# Patient Record
Sex: FEMALE | Race: WHITE | Employment: FULL TIME | URBAN - METROPOLITAN AREA
[De-identification: names, ages, dates, MRNs, and addresses within clinical notes are randomized per-mention and may not be internally consistent; named-entity substitution may affect disease eponyms.]

---

## 2018-07-07 ENCOUNTER — APPOINTMENT (OUTPATIENT)
Dept: GENERAL RADIOLOGY | Age: 33
DRG: 305 | End: 2018-07-07
Attending: EMERGENCY MEDICINE
Payer: SELF-PAY

## 2018-07-07 ENCOUNTER — HOSPITAL ENCOUNTER (INPATIENT)
Age: 33
LOS: 1 days | Discharge: HOME | DRG: 305 | End: 2018-07-08
Attending: EMERGENCY MEDICINE | Admitting: INTERNAL MEDICINE
Payer: SELF-PAY

## 2018-07-07 DIAGNOSIS — R77.8 ELEVATED TROPONIN: ICD-10-CM

## 2018-07-07 DIAGNOSIS — I16.1 HYPERTENSIVE EMERGENCY: Primary | ICD-10-CM

## 2018-07-07 PROBLEM — E66.01 MORBID OBESITY (HCC): Chronic | Status: ACTIVE | Noted: 2018-07-07

## 2018-07-07 PROBLEM — Z86.59 HISTORY OF PANIC ATTACKS: Chronic | Status: ACTIVE | Noted: 2018-07-07

## 2018-07-07 PROBLEM — E11.9 TYPE 2 DIABETES MELLITUS (HCC): Status: ACTIVE | Noted: 2018-07-07

## 2018-07-07 PROBLEM — F17.210 CIGARETTE SMOKER: Chronic | Status: ACTIVE | Noted: 2018-07-07

## 2018-07-07 PROBLEM — I24.8 DEMAND ISCHEMIA (HCC): Status: ACTIVE | Noted: 2018-07-07

## 2018-07-07 PROBLEM — I25.10 CORONARY ARTERY DISEASE INVOLVING NATIVE CORONARY ARTERY: Status: ACTIVE | Noted: 2018-07-07

## 2018-07-07 PROBLEM — I10 HYPERTENSION: Chronic | Status: ACTIVE | Noted: 2018-07-07

## 2018-07-07 PROBLEM — I25.10 CORONARY ARTERY DISEASE INVOLVING NATIVE CORONARY ARTERY: Chronic | Status: ACTIVE | Noted: 2018-07-07

## 2018-07-07 PROBLEM — E11.9 DIABETES (HCC): Chronic | Status: ACTIVE | Noted: 2018-07-07

## 2018-07-07 PROBLEM — F41.9 ANXIETY: Chronic | Status: ACTIVE | Noted: 2018-07-07

## 2018-07-07 LAB
ALBUMIN SERPL-MCNC: 3.8 G/DL (ref 3.5–5)
ALBUMIN/GLOB SERPL: 0.9 {RATIO} (ref 1.2–3.5)
ALP SERPL-CCNC: 82 U/L (ref 50–136)
ALT SERPL-CCNC: 34 U/L (ref 12–65)
ANION GAP SERPL CALC-SCNC: 9 MMOL/L (ref 7–16)
AST SERPL-CCNC: 19 U/L (ref 15–37)
BASOPHILS # BLD: 0.1 K/UL (ref 0–0.2)
BASOPHILS NFR BLD: 1 % (ref 0–2)
BILIRUB SERPL-MCNC: 0.3 MG/DL (ref 0.2–1.1)
BUN SERPL-MCNC: 10 MG/DL (ref 6–23)
CALCIUM SERPL-MCNC: 8.7 MG/DL (ref 8.3–10.4)
CHLORIDE SERPL-SCNC: 100 MMOL/L (ref 98–107)
CO2 SERPL-SCNC: 28 MMOL/L (ref 21–32)
CREAT SERPL-MCNC: 0.89 MG/DL (ref 0.6–1)
DIFFERENTIAL METHOD BLD: ABNORMAL
EOSINOPHIL # BLD: 0.2 K/UL (ref 0–0.8)
EOSINOPHIL NFR BLD: 2 % (ref 0.5–7.8)
ERYTHROCYTE [DISTWIDTH] IN BLOOD BY AUTOMATED COUNT: 12.8 % (ref 11.9–14.6)
GLOBULIN SER CALC-MCNC: 4.3 G/DL (ref 2.3–3.5)
GLUCOSE BLD STRIP.AUTO-MCNC: 294 MG/DL (ref 65–100)
GLUCOSE BLD STRIP.AUTO-MCNC: 318 MG/DL (ref 65–100)
GLUCOSE SERPL-MCNC: 289 MG/DL (ref 65–100)
HCT VFR BLD AUTO: 46.1 % (ref 35.8–46.3)
HGB BLD-MCNC: 16.9 G/DL (ref 11.7–15.4)
IMM GRANULOCYTES # BLD: 0.1 K/UL (ref 0–0.5)
IMM GRANULOCYTES NFR BLD AUTO: 1 % (ref 0–5)
LYMPHOCYTES # BLD: 3.7 K/UL (ref 0.5–4.6)
LYMPHOCYTES NFR BLD: 29 % (ref 13–44)
MCH RBC QN AUTO: 32.1 PG (ref 26.1–32.9)
MCHC RBC AUTO-ENTMCNC: 36.7 G/DL (ref 31.4–35)
MCV RBC AUTO: 87.5 FL (ref 79.6–97.8)
MONOCYTES # BLD: 0.6 K/UL (ref 0.1–1.3)
MONOCYTES NFR BLD: 4 % (ref 4–12)
NEUTS SEG # BLD: 8.3 K/UL (ref 1.7–8.2)
NEUTS SEG NFR BLD: 63 % (ref 43–78)
PLATELET # BLD AUTO: 267 K/UL (ref 150–450)
PMV BLD AUTO: 9.5 FL (ref 10.8–14.1)
POTASSIUM SERPL-SCNC: 3.4 MMOL/L (ref 3.5–5.1)
PROT SERPL-MCNC: 8.1 G/DL (ref 6.3–8.2)
RBC # BLD AUTO: 5.27 M/UL (ref 4.05–5.25)
SODIUM SERPL-SCNC: 137 MMOL/L (ref 136–145)
TROPONIN I SERPL-MCNC: 0.1 NG/ML (ref 0.02–0.05)
TROPONIN I SERPL-MCNC: 0.1 NG/ML (ref 0.02–0.05)
WBC # BLD AUTO: 12.9 K/UL (ref 4.3–11.1)

## 2018-07-07 PROCEDURE — 84484 ASSAY OF TROPONIN QUANT: CPT | Performed by: EMERGENCY MEDICINE

## 2018-07-07 PROCEDURE — 99285 EMERGENCY DEPT VISIT HI MDM: CPT | Performed by: EMERGENCY MEDICINE

## 2018-07-07 PROCEDURE — 93005 ELECTROCARDIOGRAM TRACING: CPT | Performed by: EMERGENCY MEDICINE

## 2018-07-07 PROCEDURE — 85025 COMPLETE CBC W/AUTO DIFF WBC: CPT | Performed by: EMERGENCY MEDICINE

## 2018-07-07 PROCEDURE — 74011000250 HC RX REV CODE- 250: Performed by: EMERGENCY MEDICINE

## 2018-07-07 PROCEDURE — 65270000029 HC RM PRIVATE

## 2018-07-07 PROCEDURE — 74011636637 HC RX REV CODE- 636/637: Performed by: FAMILY MEDICINE

## 2018-07-07 PROCEDURE — 36415 COLL VENOUS BLD VENIPUNCTURE: CPT | Performed by: INTERNAL MEDICINE

## 2018-07-07 PROCEDURE — 74011250637 HC RX REV CODE- 250/637: Performed by: FAMILY MEDICINE

## 2018-07-07 PROCEDURE — 96374 THER/PROPH/DIAG INJ IV PUSH: CPT | Performed by: EMERGENCY MEDICINE

## 2018-07-07 PROCEDURE — 74011250636 HC RX REV CODE- 250/636: Performed by: INTERNAL MEDICINE

## 2018-07-07 PROCEDURE — 74011636637 HC RX REV CODE- 636/637: Performed by: INTERNAL MEDICINE

## 2018-07-07 PROCEDURE — 74011250637 HC RX REV CODE- 250/637: Performed by: EMERGENCY MEDICINE

## 2018-07-07 PROCEDURE — 82962 GLUCOSE BLOOD TEST: CPT

## 2018-07-07 PROCEDURE — 74011250637 HC RX REV CODE- 250/637: Performed by: INTERNAL MEDICINE

## 2018-07-07 PROCEDURE — 71045 X-RAY EXAM CHEST 1 VIEW: CPT

## 2018-07-07 PROCEDURE — 80053 COMPREHEN METABOLIC PANEL: CPT | Performed by: EMERGENCY MEDICINE

## 2018-07-07 RX ORDER — ZOLPIDEM TARTRATE 5 MG/1
5 TABLET ORAL
Status: DISCONTINUED | OUTPATIENT
Start: 2018-07-07 | End: 2018-07-08 | Stop reason: HOSPADM

## 2018-07-07 RX ORDER — LABETALOL HYDROCHLORIDE 5 MG/ML
20 INJECTION, SOLUTION INTRAVENOUS ONCE
Status: COMPLETED | OUTPATIENT
Start: 2018-07-07 | End: 2018-07-07

## 2018-07-07 RX ORDER — LORAZEPAM 1 MG/1
1 TABLET ORAL
Status: DISCONTINUED | OUTPATIENT
Start: 2018-07-07 | End: 2018-07-08 | Stop reason: HOSPADM

## 2018-07-07 RX ORDER — CLONIDINE HYDROCHLORIDE 0.1 MG/1
0.2 TABLET ORAL
Status: COMPLETED | OUTPATIENT
Start: 2018-07-07 | End: 2018-07-07

## 2018-07-07 RX ORDER — SODIUM CHLORIDE 0.9 % (FLUSH) 0.9 %
5-10 SYRINGE (ML) INJECTION AS NEEDED
Status: DISCONTINUED | OUTPATIENT
Start: 2018-07-07 | End: 2018-07-08 | Stop reason: HOSPADM

## 2018-07-07 RX ORDER — HYDRALAZINE HYDROCHLORIDE 50 MG/1
25 TABLET, FILM COATED ORAL 3 TIMES DAILY
Status: ON HOLD | COMMUNITY
End: 2018-07-08

## 2018-07-07 RX ORDER — GABAPENTIN 100 MG/1
100 CAPSULE ORAL 3 TIMES DAILY
Status: DISCONTINUED | OUTPATIENT
Start: 2018-07-07 | End: 2018-07-08 | Stop reason: HOSPADM

## 2018-07-07 RX ORDER — CLONIDINE HYDROCHLORIDE 0.2 MG/1
0.2 TABLET ORAL 3 TIMES DAILY
COMMUNITY

## 2018-07-07 RX ORDER — INSULIN GLARGINE 100 [IU]/ML
20 INJECTION, SOLUTION SUBCUTANEOUS
Status: DISCONTINUED | OUTPATIENT
Start: 2018-07-07 | End: 2018-07-08 | Stop reason: HOSPADM

## 2018-07-07 RX ORDER — ACETAMINOPHEN 325 MG/1
650 TABLET ORAL
Status: DISCONTINUED | OUTPATIENT
Start: 2018-07-07 | End: 2018-07-08 | Stop reason: HOSPADM

## 2018-07-07 RX ORDER — ISOSORBIDE DINITRATE 30 MG/1
30 TABLET ORAL DAILY
Status: ON HOLD | COMMUNITY
End: 2018-07-07

## 2018-07-07 RX ORDER — GUAIFENESIN 100 MG/5ML
324 LIQUID (ML) ORAL
Status: COMPLETED | OUTPATIENT
Start: 2018-07-07 | End: 2018-07-07

## 2018-07-07 RX ORDER — DIPHENHYDRAMINE HYDROCHLORIDE 50 MG/ML
25 INJECTION, SOLUTION INTRAMUSCULAR; INTRAVENOUS
Status: DISCONTINUED | OUTPATIENT
Start: 2018-07-07 | End: 2018-07-08 | Stop reason: HOSPADM

## 2018-07-07 RX ORDER — ONDANSETRON 2 MG/ML
4 INJECTION INTRAMUSCULAR; INTRAVENOUS
Status: DISCONTINUED | OUTPATIENT
Start: 2018-07-07 | End: 2018-07-08 | Stop reason: HOSPADM

## 2018-07-07 RX ORDER — INSULIN LISPRO 100 [IU]/ML
INJECTION, SOLUTION INTRAVENOUS; SUBCUTANEOUS
Status: DISCONTINUED | OUTPATIENT
Start: 2018-07-07 | End: 2018-07-08 | Stop reason: HOSPADM

## 2018-07-07 RX ORDER — ENALAPRILAT 1.25 MG/ML
1.25 INJECTION INTRAVENOUS
Status: DISCONTINUED | OUTPATIENT
Start: 2018-07-07 | End: 2018-07-08 | Stop reason: HOSPADM

## 2018-07-07 RX ORDER — ISOSORBIDE DINITRATE 10 MG/1
30 TABLET ORAL DAILY
Status: DISCONTINUED | OUTPATIENT
Start: 2018-07-08 | End: 2018-07-08 | Stop reason: HOSPADM

## 2018-07-07 RX ORDER — AMLODIPINE BESYLATE 10 MG/1
10 TABLET ORAL DAILY
Status: DISCONTINUED | OUTPATIENT
Start: 2018-07-07 | End: 2018-07-07 | Stop reason: SDUPTHER

## 2018-07-07 RX ORDER — HYDRALAZINE HYDROCHLORIDE 25 MG/1
25 TABLET, FILM COATED ORAL 3 TIMES DAILY
Status: DISCONTINUED | OUTPATIENT
Start: 2018-07-07 | End: 2018-07-08

## 2018-07-07 RX ORDER — POTASSIUM CHLORIDE 20 MEQ/1
40 TABLET, EXTENDED RELEASE ORAL
Status: COMPLETED | OUTPATIENT
Start: 2018-07-07 | End: 2018-07-07

## 2018-07-07 RX ORDER — HYDRALAZINE HYDROCHLORIDE 20 MG/ML
20 INJECTION INTRAMUSCULAR; INTRAVENOUS
Status: DISCONTINUED | OUTPATIENT
Start: 2018-07-07 | End: 2018-07-07 | Stop reason: RX

## 2018-07-07 RX ORDER — CARVEDILOL 12.5 MG/1
12.5 TABLET ORAL 2 TIMES DAILY WITH MEALS
Status: ON HOLD | COMMUNITY
End: 2018-07-08

## 2018-07-07 RX ORDER — NALOXONE HYDROCHLORIDE 0.4 MG/ML
0.4 INJECTION, SOLUTION INTRAMUSCULAR; INTRAVENOUS; SUBCUTANEOUS AS NEEDED
Status: DISCONTINUED | OUTPATIENT
Start: 2018-07-07 | End: 2018-07-08 | Stop reason: HOSPADM

## 2018-07-07 RX ORDER — ISOSORBIDE MONONITRATE 20 MG/1
TABLET ORAL DAILY
COMMUNITY

## 2018-07-07 RX ORDER — FUROSEMIDE 20 MG/1
20 TABLET ORAL DAILY
Status: DISCONTINUED | OUTPATIENT
Start: 2018-07-08 | End: 2018-07-08 | Stop reason: HOSPADM

## 2018-07-07 RX ORDER — DEXTROSE 50 % IN WATER (D50W) INTRAVENOUS SYRINGE
25-50 AS NEEDED
Status: DISCONTINUED | OUTPATIENT
Start: 2018-07-07 | End: 2018-07-08 | Stop reason: HOSPADM

## 2018-07-07 RX ORDER — METOPROLOL TARTRATE 5 MG/5ML
5 INJECTION INTRAVENOUS
Status: DISCONTINUED | OUTPATIENT
Start: 2018-07-07 | End: 2018-07-08 | Stop reason: HOSPADM

## 2018-07-07 RX ORDER — DEXTROSE 40 %
15 GEL (GRAM) ORAL AS NEEDED
Status: DISCONTINUED | OUTPATIENT
Start: 2018-07-07 | End: 2018-07-08 | Stop reason: HOSPADM

## 2018-07-07 RX ORDER — AMLODIPINE BESYLATE 10 MG/1
10 TABLET ORAL DAILY
Status: DISCONTINUED | OUTPATIENT
Start: 2018-07-08 | End: 2018-07-08 | Stop reason: HOSPADM

## 2018-07-07 RX ORDER — SODIUM CHLORIDE 0.9 % (FLUSH) 0.9 %
5-10 SYRINGE (ML) INJECTION EVERY 8 HOURS
Status: DISCONTINUED | OUTPATIENT
Start: 2018-07-07 | End: 2018-07-08 | Stop reason: HOSPADM

## 2018-07-07 RX ORDER — HYDRALAZINE HYDROCHLORIDE 50 MG/1
50 TABLET, FILM COATED ORAL
Status: DISCONTINUED | OUTPATIENT
Start: 2018-07-07 | End: 2018-07-08 | Stop reason: HOSPADM

## 2018-07-07 RX ORDER — CARVEDILOL 12.5 MG/1
12.5 TABLET ORAL 2 TIMES DAILY WITH MEALS
Status: DISCONTINUED | OUTPATIENT
Start: 2018-07-08 | End: 2018-07-08

## 2018-07-07 RX ORDER — FUROSEMIDE 20 MG/1
20 TABLET ORAL DAILY
COMMUNITY

## 2018-07-07 RX ORDER — HYDRALAZINE HYDROCHLORIDE 50 MG/1
50 TABLET, FILM COATED ORAL 3 TIMES DAILY
Status: DISCONTINUED | OUTPATIENT
Start: 2018-07-07 | End: 2018-07-07 | Stop reason: SDUPTHER

## 2018-07-07 RX ORDER — ENOXAPARIN SODIUM 100 MG/ML
40 INJECTION SUBCUTANEOUS EVERY 12 HOURS
Status: DISCONTINUED | OUTPATIENT
Start: 2018-07-07 | End: 2018-07-08 | Stop reason: HOSPADM

## 2018-07-07 RX ORDER — GABAPENTIN 100 MG/1
100 CAPSULE ORAL 3 TIMES DAILY
COMMUNITY

## 2018-07-07 RX ORDER — CLONIDINE HYDROCHLORIDE 0.2 MG/1
0.2 TABLET ORAL 3 TIMES DAILY
Status: DISCONTINUED | OUTPATIENT
Start: 2018-07-07 | End: 2018-07-08 | Stop reason: HOSPADM

## 2018-07-07 RX ORDER — CLONIDINE HYDROCHLORIDE 0.1 MG/1
0.2 TABLET ORAL 2 TIMES DAILY
Status: DISCONTINUED | OUTPATIENT
Start: 2018-07-08 | End: 2018-07-07 | Stop reason: SDUPTHER

## 2018-07-07 RX ORDER — AMOXICILLIN 250 MG
2 CAPSULE ORAL
Status: DISCONTINUED | OUTPATIENT
Start: 2018-07-07 | End: 2018-07-08 | Stop reason: HOSPADM

## 2018-07-07 RX ORDER — HYDROCODONE BITARTRATE AND ACETAMINOPHEN 5; 325 MG/1; MG/1
1 TABLET ORAL
Status: DISCONTINUED | OUTPATIENT
Start: 2018-07-07 | End: 2018-07-08 | Stop reason: HOSPADM

## 2018-07-07 RX ORDER — CARVEDILOL 12.5 MG/1
12.5 TABLET ORAL 2 TIMES DAILY WITH MEALS
Status: DISCONTINUED | OUTPATIENT
Start: 2018-07-07 | End: 2018-07-07 | Stop reason: SDUPTHER

## 2018-07-07 RX ORDER — ENOXAPARIN SODIUM 100 MG/ML
40 INJECTION SUBCUTANEOUS EVERY 24 HOURS
Status: DISCONTINUED | OUTPATIENT
Start: 2018-07-07 | End: 2018-07-07 | Stop reason: DRUGHIGH

## 2018-07-07 RX ORDER — AMLODIPINE BESYLATE 10 MG/1
10 TABLET ORAL DAILY
COMMUNITY

## 2018-07-07 RX ADMIN — POTASSIUM CHLORIDE 40 MEQ: 1500 TABLET, EXTENDED RELEASE ORAL at 16:37

## 2018-07-07 RX ADMIN — AMLODIPINE BESYLATE 10 MG: 10 TABLET ORAL at 19:45

## 2018-07-07 RX ADMIN — HYDRALAZINE HYDROCHLORIDE 50 MG: 50 TABLET, FILM COATED ORAL at 19:46

## 2018-07-07 RX ADMIN — CARVEDILOL 12.5 MG: 12.5 TABLET, FILM COATED ORAL at 19:47

## 2018-07-07 RX ADMIN — INSULIN LISPRO 8 UNITS: 100 INJECTION, SOLUTION INTRAVENOUS; SUBCUTANEOUS at 21:14

## 2018-07-07 RX ADMIN — LABETALOL HYDROCHLORIDE 20 MG: 5 INJECTION, SOLUTION INTRAVENOUS at 15:35

## 2018-07-07 RX ADMIN — ENOXAPARIN SODIUM 40 MG: 100 INJECTION SUBCUTANEOUS at 21:14

## 2018-07-07 RX ADMIN — ZOLPIDEM TARTRATE 5 MG: 5 TABLET ORAL at 21:13

## 2018-07-07 RX ADMIN — Medication 10 ML: at 21:16

## 2018-07-07 RX ADMIN — INSULIN GLARGINE 20 UNITS: 100 INJECTION, SOLUTION SUBCUTANEOUS at 21:15

## 2018-07-07 RX ADMIN — HYDRALAZINE HYDROCHLORIDE 25 MG: 25 TABLET, FILM COATED ORAL at 22:37

## 2018-07-07 RX ADMIN — GABAPENTIN 100 MG: 100 CAPSULE ORAL at 22:36

## 2018-07-07 RX ADMIN — HYDROCODONE BITARTRATE AND ACETAMINOPHEN 1 TABLET: 5; 325 TABLET ORAL at 21:14

## 2018-07-07 RX ADMIN — CLONIDINE HYDROCHLORIDE 0.2 MG: 0.1 TABLET ORAL at 15:36

## 2018-07-07 RX ADMIN — ASPIRIN 81 MG 324 MG: 81 TABLET ORAL at 15:36

## 2018-07-07 NOTE — ROUTINE PROCESS
TRANSFER - OUT REPORT: 
 
Verbal report given to Tomeka Saucedo RN  on Karri Cardoza  being transferred to [address] [place] [address] for routine progression of care Report consisted of patients Situation, Background, Assessment and  
Recommendations(SBAR). Information from the following report(s) SBAR was reviewed with the receiving nurse. Lines:  
Peripheral IV 07/07/18 Left Antecubital (Active) Site Assessment Clean, dry, & intact 7/7/2018  3:39 PM  
Phlebitis Assessment 0 7/7/2018  3:39 PM  
Infiltration Assessment 0 7/7/2018  3:39 PM  
Dressing Status Clean, dry, & intact 7/7/2018  3:39 PM  
Dressing Type Transparent 7/7/2018  3:39 PM  
Hub Color/Line Status Pink 7/7/2018  3:39 PM  
  
 
Opportunity for questions and clarification was provided. Patient transported with: 
 [employer]

## 2018-07-07 NOTE — PROGRESS NOTES
TRANSFER - IN REPORT: 
 
Verbal report received from [facility] (name) on [address]  being received from ER (unit) for routine progression of care Report consisted of patients Situation, Background, Assessment and  
Recommendations(SBAR). Information from the following report(s) SBAR and Kardex was reviewed with the receiving nurse. Opportunity for questions and clarification was provided. Assessment completed upon patients arrival to unit and care assumed. SBAR given to primary receiving RN, April Shira Cisneros.

## 2018-07-07 NOTE — H&P
Hospitalist H&P Note Admit Date:  2018  2:45 PM  
Name:  See Boucher Age:  26 y.o. 
:  1985 MRN:  [id number] PCP:  Coreen Martinez MD 
Treatment Team: Attending Provider: Glory Siddiqi MD; Primary Nurse: Myles Gutiérrez RN 
 
HPI:  
Pt is a 27 y/o F with reported history of CAD s/p 2 stents, anxiety, obesity, cigarette smoker, DM, HTN, who presented with moderate chest pressure/pain that radiates to back, and SOB that started at 1130am.  Seemed to come and go and get worse with time. At first she thought it may be a panic attack but after 2-3 hours it persisted so she decided to come to the ER for evaluation. BP was very elevated on arrival 252/145. troponin mildly elevated. She was given some BP meds in ER and her symptoms seemed to improve some though she still has mild pressure and SOB at times. Denies diaphoresis, fevers, chills, n/v, cough. 10 systems reviewed and negative except as noted in HPI. Past Medical History:  
Diagnosis Date  Diabetes ([facility] [address]  Hypertension  Psychiatric disorder Past Surgical History:  
Procedure Laterality Date  CARDIAC SURG PROCEDURE UNLIST    
 stent Allergies Allergen Reactions  Flexeril [Cyclobenzaprine] Other (comments)  Tape [Adhesive] Other (comments)  Tramadol Other (comments) Social History Substance Use Topics  Smoking status: Current Every Day Smoker Packs/day: 0.25  Smokeless tobacco: Not on file  Alcohol use No  
  
History reviewed. No pertinent family history. There is no immunization history on file for this patient. PTA Medications: 
None Objective:  
 
Patient Vitals for the past 24 hrs: 
 Temp Pulse Resp BP SpO2  
18 1641 - - - - 97 % 18 1610 - - - (!) 189/112 -  
18 1605 - - - - 97 % 18 1551 - - - - 96 % 18 1536 - - - - 97 % 18 1502 - (!) 103 23 (!) 205/134 97 % 18 1444 - - - (!) 252/145 -  
18 1440 98.3 °F (36.8 °C) (!) 111 16 - 98 % Oxygen Therapy O2 Sat (%): 97 % (07/07/18 1641) Pulse via Oximetry: 95 beats per minute (07/07/18 1641) O2 Device: Room air (07/07/18 1440) No intake or output data in the 24 hours ending 07/07/18 1654 Physical Exam: 
General:    Well nourished. Alert. Eyes:   Normal sclera. Extraocular movements intact. ENT:  Normocephalic, atraumatic. Moist mucous membranes CV:   RRR. No m/r/g. Peripheral pulses 2+. Capillary refill <2s. Lungs:  CTAB. No wheezing, rhonchi, or rales. Abdomen: Soft, nontender, nondistended. Extremities: Warm and dry. No cyanosis or edema. Neurologic: CN II-XII grossly intact. Sensation intact. Skin:     No rashes or jaundice. Normal coloration Psych:  Normal mood and affect. I reviewed the labs, imaging, EKGs, telemetry, and other studies done this admission. Data Review:  
Recent Results (from the past 24 hour(s)) CBC WITH AUTOMATED DIFF Collection Time: 07/07/18  2:58 PM  
Result Value Ref Range WBC 12.9 (H) 4.3 - 11.1 K/uL  
 RBC 5.27 (H) 4.05 - 5.25 M/uL  
 HGB 16.9 (H) 11.7 - 15.4 g/dL HCT 46.1 35.8 - 46.3 % MCV 87.5 79.6 - 97.8 FL  
 MCH 32.1 26.1 - 32.9 PG  
 MCHC 36.7 (H) 31.4 - 35.0 g/dL  
 RDW 12.8 11.9 - 14.6 % PLATELET 931 815 - 403 K/uL MPV 9.5 (L) 10.8 - 14.1 FL  
 DF AUTOMATED NEUTROPHILS 63 43 - 78 % LYMPHOCYTES 29 13 - 44 % MONOCYTES 4 4.0 - 12.0 % EOSINOPHILS 2 0.5 - 7.8 % BASOPHILS 1 0.0 - 2.0 % IMMATURE GRANULOCYTES 1 0.0 - 5.0 %  
 ABS. NEUTROPHILS 8.3 (H) 1.7 - 8.2 K/UL  
 ABS. LYMPHOCYTES 3.7 0.5 - 4.6 K/UL  
 ABS. MONOCYTES 0.6 0.1 - 1.3 K/UL  
 ABS. EOSINOPHILS 0.2 0.0 - 0.8 K/UL  
 ABS. BASOPHILS 0.1 0.0 - 0.2 K/UL  
 ABS. IMM. GRANS. 0.1 0.0 - 0.5 K/UL METABOLIC PANEL, COMPREHENSIVE Collection Time: 07/07/18  2:58 PM  
Result Value Ref Range Sodium 137 136 - 145 mmol/L Potassium 3.4 (L) 3.5 - 5.1 mmol/L  Chloride 100 98 - 107 mmol/L  
 CO2 28 21 - 32 mmol/L Anion gap 9 7 - 16 mmol/L Glucose 289 (H) 65 - 100 mg/dL BUN 10 6 - 23 MG/DL Creatinine 0.89 0.6 - 1.0 MG/DL  
 GFR est AA >60 >60 ml/min/1.73m2 GFR est non-AA >60 >60 ml/min/1.73m2 Calcium 8.7 8.3 - 10.4 MG/DL Bilirubin, total 0.3 0.2 - 1.1 MG/DL  
 ALT (SGPT) 34 12 - 65 U/L  
 AST (SGOT) 19 15 - 37 U/L Alk. phosphatase 82 50 - 136 U/L Protein, total 8.1 6.3 - 8.2 g/dL Albumin 3.8 3.5 - 5.0 g/dL Globulin 4.3 (H) 2.3 - 3.5 g/dL A-G Ratio 0.9 (L) 1.2 - 3.5    
TROPONIN I Collection Time: 07/07/18  2:58 PM  
Result Value Ref Range Troponin-I, Qt. 0.10 (HH) 0.02 - 0.05 NG/ML  
EKG, 12 LEAD, INITIAL Collection Time: 07/07/18  2:59 PM  
Result Value Ref Range Ventricular Rate 103 BPM  
 Atrial Rate 108 BPM  
 P-R Interval 150 ms QRS Duration 96 ms  
 Q-T Interval 368 ms QTC Calculation (Bezet) 482 ms Calculated P Axis 53 degrees Calculated R Axis 70 degrees Calculated T Axis 32 degrees Diagnosis    
  !! AGE AND GENDER SPECIFIC ECG ANALYSIS !! Sinus tachycardia Cannot rule out Anterior infarct , age undetermined Abnormal ECG No previous ECGs available All Micro Results None Current Facility-Administered Medications Medication Dose Route Frequency  amLODIPine (NORVASC) tablet 10 mg  10 mg Oral DAILY  hydrALAZINE (APRESOLINE) tablet 50 mg  50 mg Oral TID  carvedilol (COREG) tablet 12.5 mg  12.5 mg Oral BID WITH MEALS  
 [START ON 7/8/2018] cloNIDine HCl (CATAPRES) tablet 0.2 mg  0.2 mg Oral BID No current outpatient prescriptions on file. Other Studies: Xr Chest [facility] Result Date: 7/7/2018 Portable chest x-ray CLINICAL INDICATION: Chest pain, dizziness FINDINGS: Single AP view the chest submitted without comparison show the lungs to be expanded and clear. No pleural effusion or pneumothorax. The cardiac silhouette and mediastinum are unremarkable.   
 
IMPRESSION: Unremarkable portable chest x-ray. 
 
 
Assessment and Plan:  
 
Hospital Problems as of 7/7/2018  Never Reviewed Codes Class Noted - Resolved POA * (Principal)Hypertensive emergency ICD-10-CM: I16.1 ICD-9-CM: 401.9  7/7/2018 - Present Yes Demand ischemia ([facility] ICD-10-CM: I24.8 ICD-9-CM: 411.89  7/7/2018 - Present Yes Type 2 diabetes mellitus (HCC) (Chronic) ICD-10-CM: E11.9 ICD-9-CM: 250.00  7/7/2018 - Present Yes Hypertension (Chronic) ICD-10-CM: I10 
ICD-9-CM: 401.9  7/7/2018 - Present Yes History of panic attacks (Chronic) ICD-10-CM: Z86.59 
ICD-9-CM: V11.8  7/7/2018 - Present Yes Anxiety (Chronic) ICD-10-CM: F41.9 ICD-9-CM: 300.00  7/7/2018 - Present Yes Cigarette smoker (Chronic) ICD-10-CM: I99.522 ICD-9-CM: 305.1  7/7/2018 - Present Yes Morbid obesity ([facility] (Chronic) ICD-10-CM: E66.01 
ICD-9-CM: 278.01  7/7/2018 - Present Yes Coronary artery disease involving native coronary artery (Chronic) ICD-10-CM: I25.10 ICD-9-CM: 414.01  7/7/2018 - Present Yes PLAN: 
Admit to remote tele HTN 
- restart home meds. PRNs ordered Elevated trop 
-probably demand from HTN 
-trend trops DM 
-ISS 
-asked nursing to do med rec 
-check a1c in AM 
-check UA. If proteinuria, would benefit from ACEi. Check pregnancy status 
 
hypoK -Replace 
-Check Mg Anxiety 
-Prn ativan Cigarette smoker 
-encouraged to quit Discharge planning:  No anticipated needs DVT ppx: lovenox Code status:  Full Estimated LOS:  Greater than 2 midnights Risk:  high Signed: 
Melissa Zamudio MD

## 2018-07-07 NOTE — PROGRESS NOTES
Pt admitted from the ER. She is alert and oriented. rr are even and unlabored. Lung sounds are clear she is on room air. Tolerates well.  abd is soft bowel sounds are active. She has no c/o pain. Pt BP is elevated MD is aware. Pt is able to ambulate with out assistance. Pt skin is clear no issues noted. Pt does have multiple tattoos. She has a heeled ulcer on the bottom of her rt great toe. Dual skin assessment done with Anup Miranda RN. Safety measures in place will continue to monitor.

## 2018-07-07 NOTE — ED PROVIDER NOTES
HPI Comments: 25 yo female who is here with 2 hours of constant left sided chest pressure. It started while she was at work. She has a few seconds of sharp pain that immediately goes away during these seconds she had sob, but none otherwise. She did take plavix for 1 year following a stent. She has hx of severe htn is on amlodipine 10 mg, hydralazine 50 mg tid, carvedilol 12.5, clonidine 0.2, and lasix. She has not taken any medications today though. She states she has had hx of severe blood pressure spikes in the past as well. Patient is a 26 y.o. female presenting with chest pain. The history is provided by the patient. Chest Pain (Angina) Pertinent negatives include no abdominal pain, no fever, no nausea, no palpitations, no shortness of breath and no vomiting. Past Medical History:  
Diagnosis Date  Diabetes ([address]  Hypertension  Psychiatric disorder Past Surgical History:  
Procedure Laterality Date  CARDIAC SURG PROCEDURE UNLIST    
 stent History reviewed. No pertinent family history. Social History Social History  Marital status: N/A Spouse name: N/A  
 Number of children: N/A  
 Years of education: N/A Occupational History  Not on file. Social History Main Topics  Smoking status: Current Every Day Smoker Packs/day: 0.25  Smokeless tobacco: Not on file  Alcohol use No  
 Drug use: Not on file  Sexual activity: Not on file Other Topics Concern  Not on file Social History Narrative  No narrative on file ALLERGIES: Flexeril [cyclobenzaprine]; Tape [adhesive]; and Tramadol Review of Systems Constitutional: Negative for chills and fever. Respiratory: Negative for chest tightness, shortness of breath, wheezing and stridor. Cardiovascular: Positive for chest pain. Negative for palpitations. Gastrointestinal: Negative for abdominal pain, diarrhea, nausea and vomiting. Skin: Negative. All other systems reviewed and are negative. Vitals:  
 07/07/18 1440 07/07/18 1444 07/07/18 1502 BP:  (!) 252/145 (!) 205/134 Pulse: (!) 111  (!) 103 Resp: 16  23 Temp: 98.3 °F (36.8 °C) SpO2: 98%  97% Weight: 150.6 kg (332 lb) Height: 5' 11\" (1.803 m) Physical Exam  
Constitutional: She is oriented to person, place, and time. She appears well-developed and well-nourished. No distress. HENT:  
Head: Normocephalic and atraumatic. Eyes: Conjunctivae and EOM are normal. Pupils are equal, round, and reactive to light. No scleral icterus. Neck: Normal range of motion. Neck supple. Cardiovascular: Normal rate, regular rhythm, normal heart sounds and intact distal pulses. Exam reveals no gallop and no friction rub. No murmur heard. Pulmonary/Chest: Effort normal and breath sounds normal. No stridor. No respiratory distress. She has no wheezes. She has no rales. She exhibits no tenderness. Abdominal: Soft. She exhibits no distension. There is no tenderness. There is no rebound and no guarding. Musculoskeletal: She exhibits edema (minimal edema. ). Neurological: She is alert and oriented to person, place, and time. No focal weakness Skin: Skin is warm and dry. No rash noted. She is not diaphoretic. No erythema. Psychiatric: She has a normal mood and affect. Her behavior is normal.  
Nursing note and vitals reviewed. MDM Number of Diagnoses or Management Options Diagnosis management comments: Patient has very elevated blood pressure she is feeling better chest pain wise I have given her labetalol and clonidine in the ED. I have called and spoken with Dr. Lili Beckman of cardiology. Given the very high bp that asked that I discuss case with hospitalist which I have for admission. Christiano Castañeda MD; 7/7/2018 @4:38 PM Voice dictation software was used during the making of this note.   This software is not perfect and grammatical and other typographical errors may be present. This note has not been proofread for errors. 
=================================================================== Amount and/or Complexity of Data Reviewed Clinical lab tests: ordered and reviewed (Results for orders placed or performed during the hospital encounter of 07/07/18 
-CBC WITH AUTOMATED DIFF Result                                            Value                         Ref Range WBC                                               12.9 (H)                      4.3 - 11.1 K/uL           
     RBC                                               5.27 (H)                      4.05 - 5.25 M/uL HGB                                               16.9 (H)                      11.7 - 15.4 g/dL HCT                                               46.1                          35.8 - 46.3 % MCV                                               87.5                          79.6 - 97.8 FL            
     MCH                                               32.1                          26.1 - 32.9 PG            
     MCHC                                              36.7 (H)                      31.4 - 35.0 g/dL RDW                                               12.8                          11.9 - 14.6 % PLATELET                                          267                           150 - 450 K/uL MPV                                               9.5 (L)                       10.8 - 14.1 FL            
     DF                                                AUTOMATED NEUTROPHILS                                       63                            43 - 78 % LYMPHOCYTES                                       29                            13 - 44 %      MONOCYTES 4                             4.0 - 12.0 % EOSINOPHILS                                       2                             0.5 - 7.8 % BASOPHILS                                         1                             0.0 - 2.0 % IMMATURE GRANULOCYTES                             1                             0.0 - 5.0 %               
     ABS. NEUTROPHILS                                  8.3 (H)                       1.7 - 8.2 K/UL            
     ABS. LYMPHOCYTES                                  3.7                           0.5 - 4.6 K/UL            
     ABS. MONOCYTES                                    0.6                           0.1 - 1.3 K/UL            
     ABS. EOSINOPHILS                                  0.2                           0.0 - 0.8 K/UL            
     ABS. BASOPHILS                                    0.1                           0.0 - 0.2 K/UL            
     ABS. IMM. GRANS.                                  0.1                           0.0 - 0.5 K/UL            
-METABOLIC PANEL, COMPREHENSIVE Result                                            Value                         Ref Range Sodium                                            137                           136 - 145 mmol/L Potassium                                         3.4 (L)                       3.5 - 5.1 mmol/L Chloride                                          100                           98 - 107 mmol/L           
     CO2                                               28                            21 - 32 mmol/L Anion gap                                         9                             7 - 16 mmol/L Glucose                                           289 (H)                       65 - 100 mg/dL      BUN                                               10 6 - 23 MG/DL Creatinine                                        0.89                          0.6 - 1.0 MG/DL           
     GFR est AA                                        >60                           >60 ml/min/1.73m2 GFR est non-AA                                    >60                           >60 ml/min/1.73m2 Calcium                                           8.7                           8.3 - 10.4 MG/DL Bilirubin, total                                  0.3                           0.2 - 1.1 MG/DL           
     ALT (SGPT)                                        34                            12 - 65 U/L               
     AST (SGOT)                                        19                            15 - 37 U/L Alk. phosphatase                                  82                            50 - 136 U/L Protein, total                                    8.1                           6.3 - 8.2 g/dL Albumin                                           3.8                           3.5 - 5.0 g/dL Globulin                                          4.3 (H)                       2.3 - 3.5 g/dL A-G Ratio                                         0.9 (L)                       1.2 - 3.5                 
-TROPONIN I Result                                            Value                         Ref Range Troponin-I, Qt.                                   0.10 (HH)                     0.02 - 0.05 NG/ML         
-EKG, 12 LEAD, INITIAL Result                                            Value                         Ref Range      Ventricular Rate                                  103                           BPM                       
     Atrial Rate                                       108 BPM                       
     P-R Interval                                      150                           ms                        
     QRS Duration                                      96                            ms Q-T Interval                                      368                           ms                        
     QTC Calculation (Bezet)                           482                           ms                        
     Calculated P Axis                                 53                            degrees Calculated R Axis                                 70                            degrees Calculated T Axis                                 32                            degrees Diagnosis                                                                                                 
 !! AGE AND GENDER SPECIFIC ECG ANALYSIS !! Sinus tachycardia Cannot rule out Anterior infarct , age undetermined Abnormal ECG No previous ECGs available ) Tests in the radiology section of CPT®: ordered and reviewed (Xr Chest [facility] Result Date: 7/7/2018 Portable chest x-ray CLINICAL INDICATION: Chest pain, dizziness FINDINGS: Single AP view the chest submitted without comparison show the lungs to be expanded and clear. No pleural effusion or pneumothorax. The cardiac silhouette and mediastinum are unremarkable. IMPRESSION: Unremarkable portable chest x-ray. ) Independent visualization of images, tracings, or specimens: yes ED Course Procedures

## 2018-07-07 NOTE — ED TRIAGE NOTES
Pt states that she has chest pain, states that she has had one stent placed in the past and is having some chest pain and dizziness. Pt has not taken any of her BP meds, states that she takes a lot of BP meds.

## 2018-07-07 NOTE — ED NOTES
Attempted several times to obtain BP on pt. Pt cuff repositioned and moved. Radial BP attempted as well. Cuff changed.

## 2018-07-07 NOTE — PROGRESS NOTES
Patient sitting up in bed, using telephone, appears upset. No c/o discomfort, however, bp remains elevated. Will medicated when orders have cleared, will continue to monitor.

## 2018-07-07 NOTE — IP AVS SNAPSHOT
Summary of Care Report The Summary of Care report has been created to help improve care coordination. Users with access to [employer] or [facility] (Web-based application) may access additional patient information including the Discharge Summary. If you are not currently a [address] user and need more information, please call the number listed below in the [place] [address] section and ask to be connected with Medical Records. Facility Information Name Address Phone [phone] [address] [address] [phone] Patient Information Patient Name Sex  Sherlyn Broussard ([id number]) Female 1985 Discharge Information Admitting Provider Service Area Unit Emily Grady MD / [address] Med Surg / [phone] Discharge Provider Discharge Date/Time Discharge Disposition Destination (none) 2018 (Pending) AHR (none) Patient Language Language ENGLISH [13] Hospital Problems as of 2018  Never Reviewed Class Noted - Resolved Last Modified POA Active Problems * (Principal)Hypertensive emergency  2018 - Present 2018 by Emily Grady MD Yes Entered by Emily Grady MD  
  Demand ischemia [facility])  2018 - Present 2018 by Emily Grady MD Yes Entered by Emily Grady MD  
  Type 2 diabetes mellitus [facility]) (Chronic)  2018 - Present 2018 by Emily Grady MD Yes Entered by Emily Grady MD  
  Hypertension (Chronic)  2018 - Present 2018 by Emily Grady MD Yes Entered by Emily Grady MD  
  History of panic attacks (Chronic)  2018 - Present 2018 by Emily Grady MD Yes   Entered by Emily Grady MD  
 Anxiety (Chronic)  7/7/2018 - Present 7/7/2018 by Jem Ramsey MD Yes Entered by Jem Ramsey MD  
  Cigarette smoker (Chronic)  7/7/2018 - Present 7/7/2018 by Jem Ramsey MD Yes Entered by Jem Ramsey MD  
  Morbid obesity [facility]) (Chronic)  7/7/2018 - Present 7/7/2018 by Jem Ramsey MD Yes Entered by Jem Ramsey MD  
  Coronary artery disease involving native coronary artery (Chronic)  7/7/2018 - Present 7/7/2018 by Jem Ramsey MD Yes Entered by Jem Ramsey MD  
  
You are allergic to the following Allergen Reactions Flexeril (Cyclobenzaprine) Other (comments) Tape (Adhesive) Other (comments) Tramadol Other (comments) Current Discharge Medication List  
  
START taking these medications Dose & Instructions Dispensing Information Comments  
 lisinopril 20 mg tablet Commonly known as:  Micaela Loss Start taking on:  7/9/2018 Dose:  20 mg Take 1 Tab by mouth daily. Quantity:  30 Tab Refills:  2 CONTINUE these medications which have CHANGED Dose & Instructions Dispensing Information Comments  
 carvedilol 25 mg tablet Commonly known as:  Jestine Pellet What changed:   
- medication strength 
- how much to take Dose:  25 mg Take 1 Tab by mouth two (2) times daily (with meals). Indications: hypertension Quantity:  60 Tab Refills:  2  
   
 hydrALAZINE 50 mg tablet Commonly known as:  APRESOLINE What changed:  how much to take Notes to Patient:  Resume taking as directed Dose:  50 mg Take 1 Tab by mouth three (3) times daily. Indications: hypertension Quantity:  90 Tab Refills:  2 CONTINUE these medications which have NOT CHANGED Dose & Instructions Dispensing Information Comments  
 amLODIPine 10 mg tablet Commonly known as:  Jeff Alstrom Dose:  10 mg Take 10 mg by mouth daily. Indications: hypertension Refills:  0 cloNIDine HCl 0.2 mg tablet Commonly known as:  CATAPRES Notes to Patient:  Resume taking as directed Dose:  0.2 mg Take 0.2 mg by mouth three (3) times daily. Indications: hypertension Refills:  0  
   
 gabapentin 100 mg capsule Commonly known as:  NEURONTIN Notes to Patient:  Resume taking as directed Dose:  100 mg Take 100 mg by mouth three (3) times daily. Indications: NEUROPATHIC PAIN Refills:  0  
   
 insulin detemir U-100 100 unit/mL (3 mL) Inpn Commonly known as:  Ninfa Lopez Notes to Patient:  Resume taking as directed Dose:  20 Units 20 Units by SubCUTAneous route nightly. Indications: type 2 diabetes mellitus Refills:  0  
   
 isosorbide mononitrate 20 mg tablet Commonly known as:  ISMO, MONOKET Notes to Patient:  Resume taking as directed Take  by mouth daily. Indications: HTN Refills:  0  
   
 LASIX 20 mg tablet Generic drug:  furosemide Dose:  20 mg Take 20 mg by mouth daily. Indications: Edema, hypertension Refills:  0 Follow-up Information Follow up With Details Comments Contact Info PCP Go to Call your primary care physician and make an appointment to be see 1-2 weeks for follow up regarding blood pressure Discharge Instructions DISCHARGE SUMMARY from Nurse PATIENT INSTRUCTIONS: 
 
 
F-face looks uneven A-arms unable to move or move unevenly S-speech slurred or non-existent T-time-call 911 as soon as signs and symptoms begin-DO NOT go Back to bed or wait to see if you get better-TIME IS BRAIN. Warning Signs of HEART ATTACK Call 911 if you have these symptoms: 
? Chest discomfort. Most heart attacks involve discomfort in the center of the chest that lasts more than a few minutes, or that goes away and comes back. It can feel like uncomfortable pressure, squeezing, fullness, or pain. ? Discomfort in other areas of the upper body. Symptoms can include pain or discomfort in one or both arms, the back, neck, jaw, or stomach. ? Shortness of breath with or without chest discomfort. ? Other signs may include breaking out in a cold sweat, nausea, or lightheadedness. Don't wait more than five minutes to call [address]! Fast action can save your life. Calling 911 is almost always the fastest way to get lifesaving treatment. Emergency Medical Services staff can begin treatment when they arrive  up to an hour sooner than if someone gets to the hospital by car. The discharge information has been reviewed with the patient. The patient verbalized understanding. Discharge medications reviewed with the patient and appropriate educational materials and side effects teaching were provided. ___________________________________________________________________________________________________________________________________ High Blood Pressure: Care Instructions Your Care Instructions If your blood pressure is usually above 140/90, you have high blood pressure, or hypertension. That means the top number is 140 or higher or the bottom number is 90 or higher, or both. Despite what a lot of people think, high blood pressure usually doesn't cause headaches or make you feel dizzy or lightheaded. It usually has no symptoms. But it does increase your risk for heart attack, stroke, and kidney or eye damage. The higher your blood pressure, the more your risk increases. Your doctor will give you a goal for your blood pressure. Your goal will be based on your health and your age. An example of a goal is to keep your blood pressure below 140/90. Lifestyle changes, such as eating healthy and being active, are always important to help lower blood pressure. You might also take medicine to reach your blood pressure goal. 
Follow-up care is a key part of your treatment and safety.  Be sure to make and go to all appointments, and call your doctor if you are having problems. It's also a good idea to know your test results and keep a list of the medicines you take. How can you care for yourself at home? Medical treatment · If you stop taking your medicine, your blood pressure will go back up. You may take one or more types of medicine to lower your blood pressure. Be safe with medicines. Take your medicine exactly as prescribed. Call your doctor if you think you are having a problem with your medicine. · Talk to your doctor before you start taking aspirin every day. Aspirin can help certain people lower their risk of a heart attack or stroke. But taking aspirin isn't right for everyone, because it can cause serious bleeding. · See your doctor regularly. You may need to see the doctor more often at first or until your blood pressure comes down. · If you are taking blood pressure medicine, talk to your doctor before you take decongestants or anti-inflammatory medicine, such as ibuprofen. Some of these medicines can raise blood pressure. · Learn how to check your blood pressure at home. Lifestyle changes · Stay at a healthy weight. This is especially important if you put on weight around the waist. Losing even 10 pounds can help you lower your blood pressure. · If your doctor recommends it, get more exercise. Walking is a good choice. Bit by bit, increase the amount you walk every day. Try for at least 30 minutes on most days of the week. You also may want to swim, bike, or do other activities. · Avoid or limit alcohol. Talk to your doctor about whether you can drink any alcohol. · Try to limit how much sodium you eat to less than 2,300 milligrams (mg) a day. Your doctor may ask you to try to eat less than 1,500 mg a day. · Eat plenty of fruits (such as bananas and oranges), vegetables, legumes, whole grains, and low-fat dairy products. · Lower the amount of saturated fat in your diet. Saturated fat is found in animal products such as milk, cheese, and meat. Limiting these foods may help you lose weight and also lower your risk for heart disease. · Do not smoke. Smoking increases your risk for heart attack and stroke. If you need help quitting, talk to your doctor about stop-smoking programs and medicines. These can increase your chances of quitting for good. When should you call for help? Call 911 anytime you think you may need emergency care. This may mean having symptoms that suggest that your blood pressure is causing a serious heart or blood vessel problem. Your blood pressure may be over 180/110. ? For example, call 911 if: 
? · You have symptoms of a heart attack. These may include: ¨ Chest pain or pressure, or a strange feeling in the chest. 
¨ Sweating. ¨ Shortness of breath. ¨ Nausea or vomiting. ¨ Pain, pressure, or a strange feeling in the back, neck, jaw, or upper belly or in one or both shoulders or arms. ¨ Lightheadedness or sudden weakness. ¨ A fast or irregular heartbeat. ? · You have symptoms of a stroke. These may include: 
¨ Sudden numbness, tingling, weakness, or loss of movement in your face, arm, or leg, especially on only one side of your body. ¨ Sudden vision changes. ¨ Sudden trouble speaking. ¨ Sudden confusion or trouble understanding simple statements. ¨ Sudden problems with walking or balance. ¨ A sudden, severe headache that is different from past headaches. ? · You have severe back or belly pain. ?Do not wait until your blood pressure comes down on its own. Get help right away. ?Call your doctor now or seek immediate care if: 
? · Your blood pressure is much higher than normal (such as 180/110 or higher), but you don't have symptoms. ? · You think high blood pressure is causing symptoms, such as: ¨ Severe headache. ¨ Blurry vision. ?Watch closely for changes in your health, and be sure to contact your doctor if: 
? · Your blood pressure measures 140/90 or higher at least 2 times. That means the top number is 140 or higher or the bottom number is 90 or higher, or both. ? · You think you may be having side effects from your blood pressure medicine. ? · Your blood pressure is usually normal, but it goes above normal at least 2 times. Where can you learn more? Go to http://kapil-wing.info/. Enter Y093 in the search box to learn more about \"High Blood Pressure: Care Instructions. \" Current as of: September 21, 2016 Content Version: 11.4 © 7152-4359 [employer]. Care instructions adapted under license by [employer] (which disclaims liability or warranty for this information). If you have questions about a medical condition or this instruction, always ask your healthcare professional. [place] [address] any warranty or liability for your use of this information. Chart Review Routing History No Routing History on File

## 2018-07-07 NOTE — IP AVS SNAPSHOT
[facility] 
 
 
 [address] [facility] [address] [facility] 
[phone] Patient: Go Sabillon MRN: [id number] MUO:[id number] About your hospitalization You were admitted on:  July 7, 2018 You last received care in the:  [facility] 8 MED SURG You were discharged on:  July 8, 2018 Why you were hospitalized Your primary diagnosis was:  Hypertensive Emergency Your diagnoses also included:  Demand Ischemia (Hcc), Type 2 Diabetes Mellitus (Hcc), Hypertension, History Of Panic Attacks, Anxiety, Cigarette Smoker, Morbid Obesity (Hcc), Coronary Artery Disease Involving Native Coronary Artery Follow-up Information Follow up With Details Comments Contact Info PCP Go to Call your primary care physician and make an appointment to be see 1-2 weeks for follow up regarding blood pressure Discharge Orders None A check lakshmi indicates which time of day the medication should be taken. My Medications START taking these medications Instructions Each Dose to Equal  
 Morning Noon Evening Bedtime  
 lisinopril 20 mg tablet Commonly known as:  Tamanna Cameron Start taking on:  7/9/2018 Your last dose was: This morning Your next dose is:  Tomorrow Morning Take 1 Tab by mouth daily. 20 mg CHANGE how you take these medications Instructions Each Dose to Equal  
 Morning Noon Evening Bedtime  
 carvedilol 25 mg tablet Commonly known as:  Armida Colindres What changed:   
- medication strength 
- how much to take Your last dose was: This morning Your next dose is: This evening with dinner Take 1 Tab by mouth two (2) times daily (with meals). Indications: hypertension 25 mg  
    
  
   
   
  
   
  
 hydrALAZINE 50 mg tablet Commonly known as:  APRESOLINE What changed:  how much to take Your last dose was: This morning Notes to Patient:  Resume taking as directed Take 1 Tab by mouth three (3) times daily. Indications: hypertension 50 mg CONTINUE taking these medications Instructions Each Dose to Equal  
 Morning Noon Evening Bedtime  
 amLODIPine 10 mg tablet Commonly known as:  Hansa Burnsin Your last dose was: This morning Your next dose is:  Tomorrow Morning Take 10 mg by mouth daily. Indications: hypertension 10 mg  
    
  
   
   
   
  
 cloNIDine HCl 0.2 mg tablet Commonly known as:  CATAPRES Your last dose was: This morning Notes to Patient:  Resume taking as directed Take 0.2 mg by mouth three (3) times daily. Indications: hypertension  
 0.2 mg  
    
   
   
   
  
 gabapentin 100 mg capsule Commonly known as:  NEURONTIN Your last dose was: This morning Notes to Patient:  Resume taking as directed Take 100 mg by mouth three (3) times daily. Indications: NEUROPATHIC PAIN  
 100 mg  
    
   
   
   
  
 insulin detemir U-100 100 unit/mL (3 mL) Inpn Commonly known as:  Sissy Browne Notes to Patient:  Resume taking as directed 20 Units by SubCUTAneous route nightly. Indications: type 2 diabetes mellitus 20 Units  
    
   
   
   
  
 isosorbide mononitrate 20 mg tablet Commonly known as:  ISMO, MONOKET Notes to Patient:  Resume taking as directed Take  by mouth daily. Indications: HTN  
     
   
   
   
  
 LASIX 20 mg tablet Generic drug:  furosemide Your last dose was: This morning Your next dose is:  Tomorrow Morning Take 20 mg by mouth daily. Indications: Edema, hypertension 20 mg Where to Get Your Medications Information on where to get these meds will be given to you by the nurse or doctor. ! Ask your nurse or doctor about these medications  
  carvedilol 25 mg tablet  
 hydrALAZINE 50 mg tablet  
 lisinopril 20 mg tablet Discharge Instructions DISCHARGE SUMMARY from Nurse PATIENT INSTRUCTIONS: 
 
 
F-face looks uneven A-arms unable to move or move unevenly S-speech slurred or non-existent T-time-call 911 as soon as signs and symptoms begin-DO NOT go Back to bed or wait to see if you get better-TIME IS BRAIN. Warning Signs of HEART ATTACK Call 911 if you have these symptoms: 
? Chest discomfort. Most heart attacks involve discomfort in the center of the chest that lasts more than a few minutes, or that goes away and comes back. It can feel like uncomfortable pressure, squeezing, fullness, or pain. ? Discomfort in other areas of the upper body. Symptoms can include pain or discomfort in one or both arms, the back, neck, jaw, or stomach. ? Shortness of breath with or without chest discomfort. ? Other signs may include breaking out in a cold sweat, nausea, or lightheadedness. Don't wait more than five minutes to call [address]! Fast action can save your life. Calling 911 is almost always the fastest way to get lifesaving treatment. Emergency Medical Services staff can begin treatment when they arrive  up to an hour sooner than if someone gets to the hospital by car. The discharge information has been reviewed with the patient. The patient verbalized understanding. Discharge medications reviewed with the patient and appropriate educational materials and side effects teaching were provided. ___________________________________________________________________________________________________________________________________ High Blood Pressure: Care Instructions Your Care Instructions If your blood pressure is usually above 140/90, you have high blood pressure, or hypertension. That means the top number is 140 or higher or the bottom number is 90 or higher, or both. Despite what a lot of people think, high blood pressure usually doesn't cause headaches or make you feel dizzy or lightheaded.  It usually has no symptoms. But it does increase your risk for heart attack, stroke, and kidney or eye damage. The higher your blood pressure, the more your risk increases. Your doctor will give you a goal for your blood pressure. Your goal will be based on your health and your age. An example of a goal is to keep your blood pressure below 140/90. Lifestyle changes, such as eating healthy and being active, are always important to help lower blood pressure. You might also take medicine to reach your blood pressure goal. 
Follow-up care is a key part of your treatment and safety. Be sure to make and go to all appointments, and call your doctor if you are having problems. It's also a good idea to know your test results and keep a list of the medicines you take. How can you care for yourself at home? Medical treatment · If you stop taking your medicine, your blood pressure will go back up. You may take one or more types of medicine to lower your blood pressure. Be safe with medicines. Take your medicine exactly as prescribed. Call your doctor if you think you are having a problem with your medicine. · Talk to your doctor before you start taking aspirin every day. Aspirin can help certain people lower their risk of a heart attack or stroke. But taking aspirin isn't right for everyone, because it can cause serious bleeding. · See your doctor regularly. You may need to see the doctor more often at first or until your blood pressure comes down. · If you are taking blood pressure medicine, talk to your doctor before you take decongestants or anti-inflammatory medicine, such as ibuprofen. Some of these medicines can raise blood pressure. · Learn how to check your blood pressure at home. Lifestyle changes · Stay at a healthy weight. This is especially important if you put on weight around the waist. Losing even 10 pounds can help you lower your blood pressure. · If your doctor recommends it, get more exercise.  Walking is a good choice. Bit by bit, increase the amount you walk every day. Try for at least 30 minutes on most days of the week. You also may want to swim, bike, or do other activities. · Avoid or limit alcohol. Talk to your doctor about whether you can drink any alcohol. · Try to limit how much sodium you eat to less than 2,300 milligrams (mg) a day. Your doctor may ask you to try to eat less than 1,500 mg a day. · Eat plenty of fruits (such as bananas and oranges), vegetables, legumes, whole grains, and low-fat dairy products. · Lower the amount of saturated fat in your diet. Saturated fat is found in animal products such as milk, cheese, and meat. Limiting these foods may help you lose weight and also lower your risk for heart disease. · Do not smoke. Smoking increases your risk for heart attack and stroke. If you need help quitting, talk to your doctor about stop-smoking programs and medicines. These can increase your chances of quitting for good. When should you call for help? Call 911 anytime you think you may need emergency care. This may mean having symptoms that suggest that your blood pressure is causing a serious heart or blood vessel problem. Your blood pressure may be over 180/110. ? For example, call 911 if: 
? · You have symptoms of a heart attack. These may include: ¨ Chest pain or pressure, or a strange feeling in the chest. 
¨ Sweating. ¨ Shortness of breath. ¨ Nausea or vomiting. ¨ Pain, pressure, or a strange feeling in the back, neck, jaw, or upper belly or in one or both shoulders or arms. ¨ Lightheadedness or sudden weakness. ¨ A fast or irregular heartbeat. ? · You have symptoms of a stroke. These may include: 
¨ Sudden numbness, tingling, weakness, or loss of movement in your face, arm, or leg, especially on only one side of your body. ¨ Sudden vision changes. ¨ Sudden trouble speaking. ¨ Sudden confusion or trouble understanding simple statements. ¨ Sudden problems with walking or balance. ¨ A sudden, severe headache that is different from past headaches. ? · You have severe back or belly pain. ?Do not wait until your blood pressure comes down on its own. Get help right away. ?Call your doctor now or seek immediate care if: 
? · Your blood pressure is much higher than normal (such as 180/110 or higher), but you don't have symptoms. ? · You think high blood pressure is causing symptoms, such as: ¨ Severe headache. ¨ Blurry vision. ? Watch closely for changes in your health, and be sure to contact your doctor if: 
? · Your blood pressure measures 140/90 or higher at least 2 times. That means the top number is 140 or higher or the bottom number is 90 or higher, or both. ? · You think you may be having side effects from your blood pressure medicine. ? · Your blood pressure is usually normal, but it goes above normal at least 2 times. Where can you learn more? Go to http://kapil-wing.info/. Enter X472 in the search box to learn more about \"High Blood Pressure: Care Instructions. \" Current as of: September 21, 2016 Content Version: 11.4 © 6290-9717 [employer]. Care instructions adapted under license by [employer] (which disclaims liability or warranty for this information). If you have questions about a medical condition or this instruction, always ask your healthcare professional. [place] [address] any warranty or liability for your use of this information. [employer] Announcement We are excited to announce that we are making your provider's discharge notes available to you in [employer]. You will see these notes when they are completed and signed by the physician that discharged you from your recent hospital stay.   If you have any questions or concerns about any information you see in [employer], please call the [facility] Department where you were seen or reach out to your Primary Care Provider for more information about your plan of care. Introducing [facility]! [employer] introduces Placeword patient portal. Now you can access parts of your medical record, email your doctor's office, and request medication refills online. 1. In your internet browser, go to [email or web address] 2. Click on the First Time User? Click Here link in the Sign In box. You will see the New Member Sign Up page. 3. Enter your Placeword Access Code exactly as it appears below. You will not need to use this code after youve completed the sign-up process. If you do not sign up before the expiration date, you must request a new code. · Placeword Access Code: [email or web address] Expires: 10/5/2018  2:40 PM 
 
4. Enter the last four digits of your Social Security Number (xxxx) and Date of Birth (mm/dd/yyyy) as indicated and click Submit. You will be taken to the next sign-up page. 5. Create a Placeword ID. This will be your Placeword login ID and cannot be changed, so think of one that is secure and easy to remember. 6. Create a Placeword password. You can change your password at any time. 7. Enter your Password Reset Question and Answer. This can be used at a later time if you forget your password. 8. Enter your e-mail address. You will receive e-mail notification when new information is available in [address] 9. Click Sign Up. You can now view and download portions of your medical record. 10. Click the Download Summary menu link to download a portable copy of your medical information. If you have questions, please visit the Frequently Asked Questions section of the Placeword website. Remember, Placeword is NOT to be used for urgent needs. For medical emergencies, dial 911. Now available from your iPhone and Android! Introducing Brian Ayon As a [address] [place] [address] patient, I wanted to make you aware of our electronic visit tool called Brain MaApoVax. [facility] [address] 24/7 allows you to connect within minutes with a medical provider 24 hours a day, seven days a week via a mobile device or tablet or logging into a secure website from your computer. You can access Brian Mafin from anywhere in the [place]. A virtual visit might be right for you when you have a simple condition and feel like you just dont want to get out of bed, or cant get away from work for an appointment, when your regular [address] [place] [address] provider is not available (evenings, weekends or holidays), or when youre out of town and need minor care. Electronic visits cost only $49 and if the [address] [place] [address] 24/7 provider determines a prescription is needed to treat your condition, one can be electronically transmitted to a nearby pharmacy*. Please take a moment to enroll today if you have not already done so. The enrollment process is free and takes just a few minutes. To enroll, please download the appbackr 24/7 sp to your tablet or phone, or visit [email or web address] to enroll on your computer. And, as an [address] [place] [address] patient with a [employer] account, the results of your visits will be scanned into your electronic medical record and your primary care provider will be able to view the scanned results. We urge you to continue to see your regular [address] [place] [address] provider for your ongoing medical care. And while your primary care provider may not be the one available when you seek a Brian Abreurikkifin virtual visit, the peace of mind you get from getting a real diagnosis real time can be priceless. For more information on Brian Bradyrikkifin, view our Frequently Asked Questions (FAQs) at [email or web address]. Sincerely, 
 
Irene Hodge MD 
Chief Medical Officer Siobhan Talley *:  certain medications cannot be prescribed via Brian Ayon Unresulted Labs-Please follow up with your PCP about these lab tests Order Current Status EKG, 12 LEAD, INITIAL Preliminary result Providers Seen During Your Hospitalization Provider Specialty Primary office phone Albina Tinajero MD Emergency Medicine [phone] Seamanamanda Soni, [address] [facility] Internal Medicine [phone] Your Primary Care Physician (PCP) Primary Care Physician Office Phone Office Fax OTHER, PHYS ** None ** ** None ** You are allergic to the following Allergen Reactions Flexeril (Cyclobenzaprine) Other (comments) Tape (Adhesive) Other (comments) Tramadol Other (comments) Recent Documentation Height Weight Breastfeeding? BMI Smoking Status 1.803 m 150.6 kg No 46.3 kg/m2 Current Every Day Smoker Emergency Contacts Name Discharge Info Relation Home Work Mobile James Carlson [15] [phone] Patient Belongings The following personal items are in your possession at time of discharge: 
  Dental Appliances: None  Visual Aid: Glasses, With patient      Home Medications: None   Jewelry: Bracelet, Earrings, Ring  Clothing: Pajamas, Pants, Shirt, Slippers    Other Valuables: Cell Phone, Becca Fish Please provide this summary of care documentation to your next provider. Signatures-by signing, you are acknowledging that this After Visit Summary has been reviewed with you and you have received a copy. Patient Signature:  ____________________________________________________________ Date:  ____________________________________________________________  
  
[place] Police Provider Signature:  ____________________________________________________________ Date:  ____________________________________________________________

## 2018-07-07 NOTE — ED NOTES
Spoke to  on 8th floor who states the nurse just found out about the pt coming to room and they thought was room 828 instead of 824, but has 23-14-20-09 on my end. [place] asked if could give coordinator time to look over and give a call back. Will call back in 5 minutes.

## 2018-07-08 VITALS
BODY MASS INDEX: 41.02 KG/M2 | RESPIRATION RATE: 22 BRPM | WEIGHT: 293 LBS | HEIGHT: 71 IN | TEMPERATURE: 97.6 F | DIASTOLIC BLOOD PRESSURE: 90 MMHG | SYSTOLIC BLOOD PRESSURE: 159 MMHG | OXYGEN SATURATION: 99 % | HEART RATE: 75 BPM

## 2018-07-08 LAB
ANION GAP SERPL CALC-SCNC: 10 MMOL/L (ref 7–16)
APPEARANCE UR: ABNORMAL
ATRIAL RATE: 108 BPM
BILIRUB UR QL: NEGATIVE
BUN SERPL-MCNC: 13 MG/DL (ref 6–23)
CALCIUM SERPL-MCNC: 8.4 MG/DL (ref 8.3–10.4)
CALCULATED P AXIS, ECG09: 53 DEGREES
CALCULATED R AXIS, ECG10: 70 DEGREES
CALCULATED T AXIS, ECG11: 32 DEGREES
CHLORIDE SERPL-SCNC: 101 MMOL/L (ref 98–107)
CO2 SERPL-SCNC: 28 MMOL/L (ref 21–32)
COLOR UR: YELLOW
CREAT SERPL-MCNC: 0.73 MG/DL (ref 0.6–1)
DIAGNOSIS, 93000: NORMAL
EST. AVERAGE GLUCOSE BLD GHB EST-MCNC: 177 MG/DL
GLUCOSE BLD STRIP.AUTO-MCNC: 297 MG/DL (ref 65–100)
GLUCOSE BLD STRIP.AUTO-MCNC: 302 MG/DL (ref 65–100)
GLUCOSE SERPL-MCNC: 260 MG/DL (ref 65–100)
GLUCOSE UR STRIP.AUTO-MCNC: >1000 MG/DL
HBA1C MFR BLD: 7.8 % (ref 4.8–6)
HGB UR QL STRIP: NEGATIVE
KETONES UR QL STRIP.AUTO: NEGATIVE MG/DL
LEUKOCYTE ESTERASE UR QL STRIP.AUTO: NEGATIVE
MAGNESIUM SERPL-MCNC: 2.1 MG/DL (ref 1.8–2.4)
NITRITE UR QL STRIP.AUTO: NEGATIVE
P-R INTERVAL, ECG05: 150 MS
PH UR STRIP: 5.5 [PH] (ref 5–9)
POTASSIUM SERPL-SCNC: 3.7 MMOL/L (ref 3.5–5.1)
PROT UR STRIP-MCNC: NEGATIVE MG/DL
Q-T INTERVAL, ECG07: 368 MS
QRS DURATION, ECG06: 96 MS
QTC CALCULATION (BEZET), ECG08: 482 MS
SODIUM SERPL-SCNC: 139 MMOL/L (ref 136–145)
SP GR UR REFRACTOMETRY: 1.04 (ref 1–1.02)
TROPONIN I SERPL-MCNC: 0.09 NG/ML (ref 0.02–0.05)
UROBILINOGEN UR QL STRIP.AUTO: 0.2 EU/DL (ref 0.2–1)
VENTRICULAR RATE, ECG03: 103 BPM

## 2018-07-08 PROCEDURE — 74011250637 HC RX REV CODE- 250/637: Performed by: FAMILY MEDICINE

## 2018-07-08 PROCEDURE — 74011250636 HC RX REV CODE- 250/636: Performed by: INTERNAL MEDICINE

## 2018-07-08 PROCEDURE — 80048 BASIC METABOLIC PNL TOTAL CA: CPT | Performed by: INTERNAL MEDICINE

## 2018-07-08 PROCEDURE — 74011250637 HC RX REV CODE- 250/637: Performed by: INTERNAL MEDICINE

## 2018-07-08 PROCEDURE — 81003 URINALYSIS AUTO W/O SCOPE: CPT | Performed by: INTERNAL MEDICINE

## 2018-07-08 PROCEDURE — 83735 ASSAY OF MAGNESIUM: CPT | Performed by: INTERNAL MEDICINE

## 2018-07-08 PROCEDURE — 74011636637 HC RX REV CODE- 636/637: Performed by: INTERNAL MEDICINE

## 2018-07-08 PROCEDURE — 82962 GLUCOSE BLOOD TEST: CPT

## 2018-07-08 PROCEDURE — 36415 COLL VENOUS BLD VENIPUNCTURE: CPT | Performed by: INTERNAL MEDICINE

## 2018-07-08 PROCEDURE — 83036 HEMOGLOBIN GLYCOSYLATED A1C: CPT | Performed by: INTERNAL MEDICINE

## 2018-07-08 PROCEDURE — 84484 ASSAY OF TROPONIN QUANT: CPT | Performed by: INTERNAL MEDICINE

## 2018-07-08 RX ORDER — HYDRALAZINE HYDROCHLORIDE 25 MG/1
25 TABLET, FILM COATED ORAL 3 TIMES DAILY
Status: DISCONTINUED | OUTPATIENT
Start: 2018-07-08 | End: 2018-07-08

## 2018-07-08 RX ORDER — HYDRALAZINE HYDROCHLORIDE 50 MG/1
50 TABLET, FILM COATED ORAL 3 TIMES DAILY
Status: DISCONTINUED | OUTPATIENT
Start: 2018-07-08 | End: 2018-07-08 | Stop reason: HOSPADM

## 2018-07-08 RX ORDER — CARVEDILOL 25 MG/1
25 TABLET ORAL 2 TIMES DAILY WITH MEALS
Status: DISCONTINUED | OUTPATIENT
Start: 2018-07-08 | End: 2018-07-08 | Stop reason: HOSPADM

## 2018-07-08 RX ORDER — LISINOPRIL 5 MG/1
10 TABLET ORAL DAILY
Status: DISCONTINUED | OUTPATIENT
Start: 2018-07-08 | End: 2018-07-08

## 2018-07-08 RX ORDER — LISINOPRIL 20 MG/1
20 TABLET ORAL DAILY
Qty: 30 TAB | Refills: 2 | Status: SHIPPED | OUTPATIENT
Start: 2018-07-09

## 2018-07-08 RX ORDER — LISINOPRIL 20 MG/1
20 TABLET ORAL DAILY
Status: DISCONTINUED | OUTPATIENT
Start: 2018-07-08 | End: 2018-07-08 | Stop reason: HOSPADM

## 2018-07-08 RX ORDER — CARVEDILOL 25 MG/1
25 TABLET ORAL 2 TIMES DAILY WITH MEALS
Qty: 60 TAB | Refills: 2 | Status: SHIPPED | OUTPATIENT
Start: 2018-07-08

## 2018-07-08 RX ORDER — HYDRALAZINE HYDROCHLORIDE 50 MG/1
50 TABLET, FILM COATED ORAL 3 TIMES DAILY
Qty: 90 TAB | Refills: 2 | Status: SHIPPED | OUTPATIENT
Start: 2018-07-08

## 2018-07-08 RX ORDER — HYDRALAZINE HYDROCHLORIDE 50 MG/1
50 TABLET, FILM COATED ORAL 3 TIMES DAILY
Status: DISCONTINUED | OUTPATIENT
Start: 2018-07-08 | End: 2018-07-08

## 2018-07-08 RX ADMIN — FUROSEMIDE 20 MG: 20 TABLET ORAL at 08:47

## 2018-07-08 RX ADMIN — HYDRALAZINE HYDROCHLORIDE 50 MG: 50 TABLET, FILM COATED ORAL at 03:47

## 2018-07-08 RX ADMIN — Medication 10 ML: at 05:48

## 2018-07-08 RX ADMIN — INSULIN LISPRO 6 UNITS: 100 INJECTION, SOLUTION INTRAVENOUS; SUBCUTANEOUS at 06:22

## 2018-07-08 RX ADMIN — CLONIDINE HYDROCHLORIDE 0.2 MG: 0.2 TABLET ORAL at 08:47

## 2018-07-08 RX ADMIN — HYDROCODONE BITARTRATE AND ACETAMINOPHEN 1 TABLET: 5; 325 TABLET ORAL at 08:51

## 2018-07-08 RX ADMIN — ENOXAPARIN SODIUM 40 MG: 100 INJECTION SUBCUTANEOUS at 08:50

## 2018-07-08 RX ADMIN — HYDRALAZINE HYDROCHLORIDE 50 MG: 50 TABLET, FILM COATED ORAL at 12:47

## 2018-07-08 RX ADMIN — CARVEDILOL 25 MG: 25 TABLET, FILM COATED ORAL at 09:04

## 2018-07-08 RX ADMIN — GABAPENTIN 100 MG: 100 CAPSULE ORAL at 08:48

## 2018-07-08 RX ADMIN — HYDRALAZINE HYDROCHLORIDE 25 MG: 25 TABLET, FILM COATED ORAL at 09:04

## 2018-07-08 RX ADMIN — INSULIN LISPRO 8 UNITS: 100 INJECTION, SOLUTION INTRAVENOUS; SUBCUTANEOUS at 12:47

## 2018-07-08 RX ADMIN — AMLODIPINE BESYLATE 10 MG: 10 TABLET ORAL at 08:47

## 2018-07-08 RX ADMIN — Medication 1 AMPULE: at 08:46

## 2018-07-08 RX ADMIN — CLONIDINE HYDROCHLORIDE 0.2 MG: 0.2 TABLET ORAL at 03:47

## 2018-07-08 RX ADMIN — LISINOPRIL 20 MG: 20 TABLET ORAL at 10:14

## 2018-07-08 NOTE — PROGRESS NOTES
Discharge paperwork reviewed with patient. She verbalizes understanding of instructions, follow up appointments and medications. Prescription given to patient for lisinopril as well as work excuse for 7/7 and 7/8. IV DC'd with cath tip intact. April, RN notified that instructions are complete and the patient has called her ride.

## 2018-07-08 NOTE — DISCHARGE INSTRUCTIONS
DISCHARGE SUMMARY from Nurse    PATIENT INSTRUCTIONS:    After general anesthesia or intravenous sedation, for 24 hours or while taking prescription Narcotics:  · Limit your activities  · Do not drive and operate hazardous machinery  · Do not make important personal or business decisions  · Do  not drink alcoholic beverages  · If you have not urinated within 8 hours after discharge, please contact your surgeon on call. Report the following to your surgeon:  · Excessive pain, swelling, redness or odor of or around the surgical area  · Temperature over 100.5  · Nausea and vomiting lasting longer than 4 hours or if unable to take medications  · Any signs of decreased circulation or nerve impairment to extremity: change in color, persistent  numbness, tingling, coldness or increase pain  · Any questions      *  Please give a list of your current medications to your Primary Care Provider. *  Please update this list whenever your medications are discontinued, doses are      changed, or new medications (including over-the-counter products) are added. *  Please carry medication information at all times in case of emergency situations. These are general instructions for a healthy lifestyle:    No smoking/ No tobacco products/ Avoid exposure to second hand smoke  Surgeon General's Warning:  Quitting smoking now greatly reduces serious risk to your health. Obesity, smoking, and sedentary lifestyle greatly increases your risk for illness    A healthy diet, regular physical exercise & weight monitoring are important for maintaining a healthy lifestyle    You may be retaining fluid if you have a history of heart failure or if you experience any of the following symptoms:  Weight gain of 3 pounds or more overnight or 5 pounds in a week, increased swelling in our hands or feet or shortness of breath while lying flat in bed.   Please call your doctor as soon as you notice any of these symptoms; do not wait until your next office visit. Recognize signs and symptoms of STROKE:    F-face looks uneven    A-arms unable to move or move unevenly    S-speech slurred or non-existent    T-time-call 911 as soon as signs and symptoms begin-DO NOT go       Back to bed or wait to see if you get better-TIME IS BRAIN. Warning Signs of HEART ATTACK     Call 911 if you have these symptoms:   Chest discomfort. Most heart attacks involve discomfort in the center of the chest that lasts more than a few minutes, or that goes away and comes back. It can feel like uncomfortable pressure, squeezing, fullness, or pain.  Discomfort in other areas of the upper body. Symptoms can include pain or discomfort in one or both arms, the back, neck, jaw, or stomach.  Shortness of breath with or without chest discomfort.  Other signs may include breaking out in a cold sweat, nausea, or lightheadedness. Don't wait more than five minutes to call 911 - MINUTES MATTER! Fast action can save your life. Calling 911 is almost always the fastest way to get lifesaving treatment. Emergency Medical Services staff can begin treatment when they arrive -- up to an hour sooner than if someone gets to the hospital by car. The discharge information has been reviewed with the patient. The patient verbalized understanding. Discharge medications reviewed with the patient and appropriate educational materials and side effects teaching were provided. ___________________________________________________________________________________________________________________________________         High Blood Pressure: Care Instructions  Your Care Instructions    If your blood pressure is usually above 140/90, you have high blood pressure, or hypertension. That means the top number is 140 or higher or the bottom number is 90 or higher, or both. Despite what a lot of people think, high blood pressure usually doesn't cause headaches or make you feel dizzy or lightheaded.  It usually has no symptoms. But it does increase your risk for heart attack, stroke, and kidney or eye damage. The higher your blood pressure, the more your risk increases. Your doctor will give you a goal for your blood pressure. Your goal will be based on your health and your age. An example of a goal is to keep your blood pressure below 140/90. Lifestyle changes, such as eating healthy and being active, are always important to help lower blood pressure. You might also take medicine to reach your blood pressure goal.  Follow-up care is a key part of your treatment and safety. Be sure to make and go to all appointments, and call your doctor if you are having problems. It's also a good idea to know your test results and keep a list of the medicines you take. How can you care for yourself at home? Medical treatment  · If you stop taking your medicine, your blood pressure will go back up. You may take one or more types of medicine to lower your blood pressure. Be safe with medicines. Take your medicine exactly as prescribed. Call your doctor if you think you are having a problem with your medicine. · Talk to your doctor before you start taking aspirin every day. Aspirin can help certain people lower their risk of a heart attack or stroke. But taking aspirin isn't right for everyone, because it can cause serious bleeding. · See your doctor regularly. You may need to see the doctor more often at first or until your blood pressure comes down. · If you are taking blood pressure medicine, talk to your doctor before you take decongestants or anti-inflammatory medicine, such as ibuprofen. Some of these medicines can raise blood pressure. · Learn how to check your blood pressure at home. Lifestyle changes  · Stay at a healthy weight. This is especially important if you put on weight around the waist. Losing even 10 pounds can help you lower your blood pressure. · If your doctor recommends it, get more exercise.  Walking is a good choice. Bit by bit, increase the amount you walk every day. Try for at least 30 minutes on most days of the week. You also may want to swim, bike, or do other activities. · Avoid or limit alcohol. Talk to your doctor about whether you can drink any alcohol. · Try to limit how much sodium you eat to less than 2,300 milligrams (mg) a day. Your doctor may ask you to try to eat less than 1,500 mg a day. · Eat plenty of fruits (such as bananas and oranges), vegetables, legumes, whole grains, and low-fat dairy products. · Lower the amount of saturated fat in your diet. Saturated fat is found in animal products such as milk, cheese, and meat. Limiting these foods may help you lose weight and also lower your risk for heart disease. · Do not smoke. Smoking increases your risk for heart attack and stroke. If you need help quitting, talk to your doctor about stop-smoking programs and medicines. These can increase your chances of quitting for good. When should you call for help? Call 911 anytime you think you may need emergency care. This may mean having symptoms that suggest that your blood pressure is causing a serious heart or blood vessel problem. Your blood pressure may be over 180/110. ? For example, call 911 if:  ? · You have symptoms of a heart attack. These may include:  ¨ Chest pain or pressure, or a strange feeling in the chest.  ¨ Sweating. ¨ Shortness of breath. ¨ Nausea or vomiting. ¨ Pain, pressure, or a strange feeling in the back, neck, jaw, or upper belly or in one or both shoulders or arms. ¨ Lightheadedness or sudden weakness. ¨ A fast or irregular heartbeat. ? · You have symptoms of a stroke. These may include:  ¨ Sudden numbness, tingling, weakness, or loss of movement in your face, arm, or leg, especially on only one side of your body. ¨ Sudden vision changes. ¨ Sudden trouble speaking. ¨ Sudden confusion or trouble understanding simple statements.   ¨ Sudden problems with walking or balance. ¨ A sudden, severe headache that is different from past headaches. ? · You have severe back or belly pain. ?Do not wait until your blood pressure comes down on its own. Get help right away. ?Call your doctor now or seek immediate care if:  ? · Your blood pressure is much higher than normal (such as 180/110 or higher), but you don't have symptoms. ? · You think high blood pressure is causing symptoms, such as:  ¨ Severe headache. ¨ Blurry vision. ? Watch closely for changes in your health, and be sure to contact your doctor if:  ? · Your blood pressure measures 140/90 or higher at least 2 times. That means the top number is 140 or higher or the bottom number is 90 or higher, or both. ? · You think you may be having side effects from your blood pressure medicine. ? · Your blood pressure is usually normal, but it goes above normal at least 2 times. Where can you learn more? Go to http://kapil-wing.info/. Enter Y853 in the search box to learn more about \"High Blood Pressure: Care Instructions. \"  Current as of: September 21, 2016  Content Version: 11.4  © 9954-6967 "1,2,[employer]". Care instructions adapted under license by [employer] (which disclaims liability or warranty for this information). If you have questions about a medical condition or this instruction, always ask your healthcare professional. [place] [address] any warranty or liability for your use of this information.

## 2018-07-08 NOTE — PROGRESS NOTES
Patient requested pain medication for legs at bedtime, was medicated with Norco at 2114 and relief of discomfort achieved. Home medication list complete and orders received. Last bp 159/80. Will continue to monitor.

## 2018-07-08 NOTE — DISCHARGE SUMMARY
Hospitalist Discharge Summary     Admit Date:  2018  2:45 PM   Name:  Matty Diane   Age:  26 y.o.  :  1985   MRN:  [id number]   PCP:  Cristine Boyle MD  Treatment Team: Attending Provider: Adriana Florian MD    Problem List for this Hospitalization:  Hospital Problems as of 2018  Never Reviewed          Codes Class Noted - Resolved POA    * (Principal)Hypertensive emergency ICD-10-CM: I16.1  ICD-9-CM: 401.9  2018 - Present Yes        Demand ischemia ([facility] ICD-10-CM: I24.8  ICD-9-CM: 411.89  2018 - Present Yes        Type 2 diabetes mellitus ([facility] (Chronic) ICD-10-CM: E11.9  ICD-9-CM: 250.00  2018 - Present Yes        Hypertension (Chronic) ICD-10-CM: I10  ICD-9-CM: 401.9  2018 - Present Yes        History of panic attacks (Chronic) ICD-10-CM: Z86.59  ICD-9-CM: V11.8  2018 - Present Yes        Anxiety (Chronic) ICD-10-CM: F41.9  ICD-9-CM: 300.00  2018 - Present Yes        Cigarette smoker (Chronic) ICD-10-CM: F17.210  ICD-9-CM: 305.1  2018 - Present Yes        Morbid obesity ([facility] (Chronic) ICD-10-CM: E66.01  ICD-9-CM: 278.01  2018 - Present Yes        Coronary artery disease involving native coronary artery (Chronic) ICD-10-CM: I25.10  ICD-9-CM: 414.01  2018 - Present Yes                Admission HPI from 2018:    \"Pt is a 27 y/o F with reported history of CAD s/p 2 stents, anxiety, obesity, cigarette smoker, DM, HTN, who presented with moderate chest pressure/pain that radiates to back, and SOB that started at 1130am.  Seemed to come and go and get worse with time. At first she thought it may be a panic attack but after 2-3 hours it persisted so she decided to come to the ER for evaluation. BP was very elevated on arrival 252/145. troponin mildly elevated. She was given some BP meds in ER and her symptoms seemed to improve some though she still has mild pressure and SOB at times. Denies diaphoresis, fevers, chills, n/v, cough. [facility] Course:  Patient was admitted for hypertensive emergency with elevated troponins and CP. Her trops stayed stable, likely demand ischemia. Her home HTN meds were resumed but her coreg and hydralazine were increased and lisinopril added. She says she tolerates the 10 and 20mg dose of lisinopril but the 40mg dose gives her cough. I told her to stop the lisinopril if she develops any issues with persistent cough, and call her PCP. She will need outpatient follow up to ensure her BP remains at goal.  She was instructed to stop smoking also. She is feeling better today and ready to go home. Follow up instructions and discharge meds at bottom of this note. Plan was discussed with pt. All questions answered. Patient was stable at time of discharge. Diagnostic Imaging/Tests:   Xr Chest Port    Result Date: 7/7/2018  Portable chest x-ray CLINICAL INDICATION: Chest pain, dizziness FINDINGS: Single AP view the chest submitted without comparison show the lungs to be expanded and clear. No pleural effusion or pneumothorax. The cardiac silhouette and mediastinum are unremarkable. IMPRESSION: Unremarkable portable chest x-ray. Echocardiogram results:  No results found for this visit on 07/07/18.       All Micro Results     None          Labs: Results:       BMP, Mg, Phos Recent Labs      07/08/18   0746  07/07/18   1458   NA  139  137   K  3.7  3.4*   CL  101  100   CO2  28  28   AGAP  10  9   BUN  13  10   CREA  0.73  0.89   CA  8.4  8.7   GLU  260*  289*   MG  2.1   --       CBC Recent Labs      07/07/18   1458   WBC  12.9*   RBC  5.27*   HGB  16.9*   HCT  46.1   PLT  267   GRANS  63   LYMPH  29   EOS  2   MONOS  4   BASOS  1   IG  1   ANEU  8.3*   ABL  3.7   YSABEL  0.2   ABM  0.6   ABB  0.1   AIG  0.1      LFT Recent Labs      07/07/18   1458   SGOT  19   ALT  34   AP  82   TP  8.1   ALB  3.8   GLOB  4.3*   AGRAT  0.9*      Cardiac Testing Lab Results   Component Value Date/Time    Troponin-I, Qt. 0.09 (H) 07/08/2018 07:46 AM    Troponin-I, Qt. 0.10 (HH) 07/07/2018 09:37 PM    Troponin-I, Qt. 0.10 (HH) 07/07/2018 02:58 PM      Coagulation Tests No results found for: PTP, INR, APTT   A1c Lab Results   Component Value Date/Time    Hemoglobin A1c 7.8 (H) 07/08/2018 07:46 AM      Lipid Panel No results found for: CHOL, CHOLPOCT, CHOLX, CHLST, CHOLV, 296196, HDL, LDL, LDLC, DLDLP, 524871, VLDLC, VLDL, TGLX, TRIGL, TRIGP, TGLPOCT, CHHD, CHHDX   Thyroid Panel No results found for: TSH, T4, FT4, TT3, T3U, TSHEXT     Most Recent UA Lab Results   Component Value Date/Time    Color YELLOW 07/08/2018 10:21 AM    Appearance CLOUDY 07/08/2018 10:21 AM    Specific gravity 1.045 (H) 07/08/2018 10:21 AM    pH (UA) 5.5 07/08/2018 10:21 AM    Protein NEGATIVE  07/08/2018 10:21 AM    Glucose >1000 07/08/2018 10:21 AM    Ketone NEGATIVE  07/08/2018 10:21 AM    Bilirubin NEGATIVE  07/08/2018 10:21 AM    Blood NEGATIVE  07/08/2018 10:21 AM    Urobilinogen 0.2 07/08/2018 10:21 AM    Nitrites NEGATIVE  07/08/2018 10:21 AM    Leukocyte Esterase NEGATIVE  07/08/2018 10:21 AM        Allergies   Allergen Reactions    Flexeril [Cyclobenzaprine] Other (comments)    Tape [Adhesive] Other (comments)    Tramadol Other (comments)       There is no immunization history on file for this patient. All Labs from Last 24 Hrs:  Recent Results (from the past 24 hour(s))   CBC WITH AUTOMATED DIFF    Collection Time: 07/07/18  2:58 PM   Result Value Ref Range    WBC 12.9 (H) 4.3 - 11.1 K/uL    RBC 5.27 (H) 4.05 - 5.25 M/uL    HGB 16.9 (H) 11.7 - 15.4 g/dL    HCT 46.1 35.8 - 46.3 %    MCV 87.5 79.6 - 97.8 FL    MCH 32.1 26.1 - 32.9 PG    MCHC 36.7 (H) 31.4 - 35.0 g/dL    RDW 12.8 11.9 - 14.6 %    PLATELET 252 549 - 829 K/uL    MPV 9.5 (L) 10.8 - 14.1 FL    DF AUTOMATED      NEUTROPHILS 63 43 - 78 %    LYMPHOCYTES 29 13 - 44 %    MONOCYTES 4 4.0 - 12.0 %    EOSINOPHILS 2 0.5 - 7.8 %    BASOPHILS 1 0.0 - 2.0 %    IMMATURE GRANULOCYTES 1 0.0 - 5.0 %    ABS. NEUTROPHILS 8.3 (H) 1.7 - 8.2 K/UL    ABS. LYMPHOCYTES 3.7 0.5 - 4.6 K/UL    ABS. MONOCYTES 0.6 0.1 - 1.3 K/UL    ABS. EOSINOPHILS 0.2 0.0 - 0.8 K/UL    ABS. BASOPHILS 0.1 0.0 - 0.2 K/UL    ABS. IMM. GRANS. 0.1 0.0 - 0.5 K/UL   METABOLIC PANEL, COMPREHENSIVE    Collection Time: 07/07/18  2:58 PM   Result Value Ref Range    Sodium 137 136 - 145 mmol/L    Potassium 3.4 (L) 3.5 - 5.1 mmol/L    Chloride 100 98 - 107 mmol/L    CO2 28 21 - 32 mmol/L    Anion gap 9 7 - 16 mmol/L    Glucose 289 (H) 65 - 100 mg/dL    BUN 10 6 - 23 MG/DL    Creatinine 0.89 0.6 - 1.0 MG/DL    GFR est AA >60 >60 ml/min/1.73m2    GFR est non-AA >60 >60 ml/min/1.73m2    Calcium 8.7 8.3 - 10.4 MG/DL    Bilirubin, total 0.3 0.2 - 1.1 MG/DL    ALT (SGPT) 34 12 - 65 U/L    AST (SGOT) 19 15 - 37 U/L    Alk. phosphatase 82 50 - 136 U/L    Protein, total 8.1 6.3 - 8.2 g/dL    Albumin 3.8 3.5 - 5.0 g/dL    Globulin 4.3 (H) 2.3 - 3.5 g/dL    A-G Ratio 0.9 (L) 1.2 - 3.5     TROPONIN I    Collection Time: 07/07/18  2:58 PM   Result Value Ref Range    Troponin-I, Qt. 0.10 (HH) 0.02 - 0.05 NG/ML   EKG, 12 LEAD, INITIAL    Collection Time: 07/07/18  2:59 PM   Result Value Ref Range    Ventricular Rate 103 BPM    Atrial Rate 108 BPM    P-R Interval 150 ms    QRS Duration 96 ms    Q-T Interval 368 ms    QTC Calculation (Bezet) 482 ms    Calculated P Axis 53 degrees    Calculated R Axis 70 degrees    Calculated T Axis 32 degrees    Diagnosis       !! AGE AND GENDER SPECIFIC ECG ANALYSIS !!   Sinus tachycardia  Cannot rule out Anterior infarct , age undetermined  Abnormal ECG  No previous ECGs available     GLUCOSE, POC    Collection Time: 07/07/18  6:12 PM   Result Value Ref Range    Glucose (POC) 294 (H) 65 - 100 mg/dL   GLUCOSE, POC    Collection Time: 07/07/18  9:00 PM   Result Value Ref Range    Glucose (POC) 318 (H) 65 - 100 mg/dL   TROPONIN I    Collection Time: 07/07/18  9:37 PM   Result Value Ref Range    Troponin-I, Qt. 0.10 (HH) 0.02 - 0.05 NG/ML GLUCOSE, POC    Collection Time: 07/08/18  5:38 AM   Result Value Ref Range    Glucose (POC) 297 (H) 65 - 420 mg/dL   METABOLIC PANEL, BASIC    Collection Time: 07/08/18  7:46 AM   Result Value Ref Range    Sodium 139 136 - 145 mmol/L    Potassium 3.7 3.5 - 5.1 mmol/L    Chloride 101 98 - 107 mmol/L    CO2 28 21 - 32 mmol/L    Anion gap 10 7 - 16 mmol/L    Glucose 260 (H) 65 - 100 mg/dL    BUN 13 6 - 23 MG/DL    Creatinine 0.73 0.6 - 1.0 MG/DL    GFR est AA >60 >60 ml/min/1.73m2    GFR est non-AA >60 >60 ml/min/1.73m2    Calcium 8.4 8.3 - 10.4 MG/DL   MAGNESIUM    Collection Time: 07/08/18  7:46 AM   Result Value Ref Range    Magnesium 2.1 1.8 - 2.4 mg/dL   HEMOGLOBIN A1C WITH EAG    Collection Time: 07/08/18  7:46 AM   Result Value Ref Range    Hemoglobin A1c 7.8 (H) 4.8 - 6.0 %    Est. average glucose 177 mg/dL   TROPONIN I    Collection Time: 07/08/18  7:46 AM   Result Value Ref Range    Troponin-I, Qt. 0.09 (H) 0.02 - 0.05 NG/ML   URINALYSIS W/ RFLX MICROSCOPIC    Collection Time: 07/08/18 10:21 AM   Result Value Ref Range    Color YELLOW      Appearance CLOUDY      Specific gravity 1.045 (H) 1.001 - 1.023      pH (UA) 5.5 5.0 - 9.0      Protein NEGATIVE  NEG mg/dL    Glucose >1000 mg/dL    Ketone NEGATIVE  NEG mg/dL    Bilirubin NEGATIVE  NEG      Blood NEGATIVE  NEG      Urobilinogen 0.2 0.2 - 1.0 EU/dL    Nitrites NEGATIVE  NEG      Leukocyte Esterase NEGATIVE  NEG     GLUCOSE, POC    Collection Time: 07/08/18 11:52 AM   Result Value Ref Range    Glucose (POC) 302 (H) 65 - 100 mg/dL       Discharge Exam:  Patient Vitals for the past 24 hrs:   Temp Pulse Resp BP SpO2   07/08/18 1207 97.6 °F (36.4 °C) 75 22 159/90 99 %   07/08/18 0808 97.8 °F (36.6 °C) 80 24 (!) 163/142 99 %   07/08/18 0334 97.7 °F (36.5 °C) 87 22 (!) 152/102 94 %   07/07/18 2346 97.7 °F (36.5 °C) 87 20 159/80 97 %   07/07/18 2111 - 92 - 161/90 -   07/07/18 1947 - 91 - (!) 201/119 -   07/07/18 1946 - 91 - (!) 201/119 -   07/07/18 1945 - 91 - (!) 201/119 -   07/07/18 1943 98 °F (36.7 °C) 91 18 (!) 156/113 98 %   07/07/18 1807 98.1 °F (36.7 °C) 92 22 (!) 167/126 100 %   07/07/18 1718 - - - - 98 %   07/07/18 1641 - - - - 97 %   07/07/18 1610 - - - (!) 189/112 -   07/07/18 1605 - - - - 97 %   07/07/18 1551 - - - - 96 %   07/07/18 1536 - - - - 97 %   07/07/18 1502 - (!) 103 23 (!) 205/134 97 %   07/07/18 1444 - - - (!) 252/145 -   07/07/18 1440 98.3 °F (36.8 °C) (!) 111 16 - 98 %     Oxygen Therapy  O2 Sat (%): 99 % (07/08/18 1207)  Pulse via Oximetry: 92 beats per minute (07/07/18 1718)  O2 Device: Room air (07/07/18 1440)  No intake or output data in the 24 hours ending 07/08/18 1224    General:    Well nourished. Alert. Eyes:   Normal sclera. Extraocular movements intact. ENT:  Normocephalic, atraumatic. Moist mucous membranes  CV:   Regular rate and rhythm. No murmur, rub, or gallop. Lungs:  Clear to auscultation bilaterally. No wheezing, rhonchi, or rales. Abdomen: Soft, nontender, nondistended. Bowel sounds normal.   Extremities: Warm and dry. No cyanosis or edema. Neurologic: CN II-XII grossly intact. Sensation intact. Skin:     No rashes or jaundice. Psych:  Normal mood and affect. Discharge Info:   Current Discharge Medication List      START taking these medications    Details   lisinopril (PRINIVIL, ZESTRIL) 20 mg tablet Take 1 Tab by mouth daily. Qty: 30 Tab, Refills: 2         CONTINUE these medications which have CHANGED    Details   carvedilol (COREG) 25 mg tablet Take 1 Tab by mouth two (2) times daily (with meals). Indications: hypertension  Qty: 60 Tab, Refills: 2      hydrALAZINE (APRESOLINE) 50 mg tablet Take 1 Tab by mouth three (3) times daily. Indications: hypertension  Qty: 90 Tab, Refills: 2         CONTINUE these medications which have NOT CHANGED    Details   cloNIDine HCl (CATAPRES) 0.2 mg tablet Take 0.2 mg by mouth three (3) times daily.  Indications: hypertension      amLODIPine (NORVASC) 10 mg tablet Take 10 mg by mouth daily. Indications: hypertension      furosemide (LASIX) 20 mg tablet Take 20 mg by mouth daily. Indications: Edema, hypertension      gabapentin (NEURONTIN) 100 mg capsule Take 100 mg by mouth three (3) times daily. Indications: NEUROPATHIC PAIN      insulin detemir U-100 (LEVEMIR FLEXTOUCH) 100 unit/mL (3 mL) inpn 20 Units by SubCUTAneous route nightly. Indications: type 2 diabetes mellitus      isosorbide mononitrate (ISMO, MONOKET) 20 mg tablet Take  by mouth daily. Indications: HTN         STOP taking these medications       isosorbide dinitrate (ISORDIL) 30 mg tablet Comments:   Reason for Stopping:                 Disposition: home    Activity: Activity as tolerated  Diet: DIET CARDIAC Regular    Follow-up Appointments   Procedures    FOLLOW UP VISIT Appointment in: Other (Specify) 1-2 weeks with PCP for follow up blood pressure     1-2 weeks with PCP for follow up blood pressure     Standing Status:   Standing     Number of Occurrences:   1     Order Specific Question:   Appointment in     Answer: Other (Specify)         Follow-up Information     Follow up With Details Comments Contact Info    PCP Go to 1-2 weeks for follow up regarding blood pressure           Time spent in patient discharge planning and coordination 35 minutes.     Signed:  Anne Marie Miller MD

## 2018-07-08 NOTE — PROGRESS NOTES
Patient resting quietly, eyes closed, respirations easy, bp has come down. No s/s of distress or discomfort. Will continue to monitor.

## 2018-07-08 NOTE — PROGRESS NOTES
Patient is alert and oriented x 4. Patient is resting in bed quietly. Blood pressure is still elevated and was 163/142 this morning. Lung sounds are clear. Collected urine analysis this morning and was sent down to the lab. Will continue to monitor patients blood pressure throughout the shift. Possible discharge this afternoon per MD if blood pressure is WNL.

## 2018-07-08 NOTE — PROGRESS NOTES
Pt discharged home via private car/ wheelchair. She is alert and oriented. rr are even and unlabored. She was given all discharge instructions. She verbalized understanding of all instructions and follow up appointments. She is leaving with all of her belongings. She was educated on diabetes, meds and HTN. She verbalized understanding of theses instructions. She is stable.

## 2018-07-08 NOTE — PROGRESS NOTES
7/8/2018 RE: See Boucher To Whom it May Concern: This is to certify that See Boucher was a patient at [facility] from 7/7 until 7/8. Please feel free to contact my office if you have any questions or concerns. Thank you for your assistance in this matter. Sincerely, Amanda Barkley RN

## 2022-03-18 PROBLEM — F41.9 ANXIETY: Status: ACTIVE | Noted: 2018-07-07

## 2022-03-18 PROBLEM — I24.89 DEMAND ISCHEMIA (HCC): Status: ACTIVE | Noted: 2018-07-07

## 2022-03-19 PROBLEM — Z86.59 HISTORY OF PANIC ATTACKS: Status: ACTIVE | Noted: 2018-07-07

## 2022-03-19 PROBLEM — I10 HYPERTENSION: Status: ACTIVE | Noted: 2018-07-07

## 2022-03-19 PROBLEM — F17.210 CIGARETTE SMOKER: Status: ACTIVE | Noted: 2018-07-07

## 2022-03-19 PROBLEM — E66.01 MORBID OBESITY: Status: ACTIVE | Noted: 2018-07-07

## 2022-03-19 PROBLEM — I25.10 CORONARY ARTERY DISEASE INVOLVING NATIVE CORONARY ARTERY: Status: ACTIVE | Noted: 2018-07-07

## 2022-03-19 PROBLEM — I16.1 HYPERTENSIVE EMERGENCY: Status: ACTIVE | Noted: 2018-07-07

## 2022-03-19 PROBLEM — E11.9 TYPE 2 DIABETES MELLITUS (HCC): Status: ACTIVE | Noted: 2018-07-07

## 2025-01-20 ENCOUNTER — HOSPITAL ENCOUNTER (OUTPATIENT)
Dept: PHYSICAL THERAPY | Age: 40
Setting detail: RECURRING SERIES
Discharge: HOME | End: 2025-01-23
Payer: COMMERCIAL

## 2025-01-20 DIAGNOSIS — M62.89 OTHER SPECIFIED DISORDERS OF MUSCLE: ICD-10-CM

## 2025-01-20 DIAGNOSIS — R27.8 OTHER LACK OF COORDINATION: ICD-10-CM

## 2025-01-20 DIAGNOSIS — R39.89 OTHER SYMPTOMS AND SIGNS INVOLVING THE GENITOURINARY SYSTEM: ICD-10-CM

## 2025-01-20 DIAGNOSIS — R15.9 FULL INCONTINENCE OF FECES: ICD-10-CM

## 2025-01-20 DIAGNOSIS — N39.46 MIXED INCONTINENCE: Primary | ICD-10-CM

## 2025-01-20 PROCEDURE — 97163 PT EVAL HIGH COMPLEX 45 MIN: CPT

## 2025-01-20 ASSESSMENT — PAIN SCALES - GENERAL: PAINLEVEL_OUTOF10: 0

## 2025-01-20 NOTE — THERAPY EVALUATION
Annie Gallo  : 1985  Primary: [employer] ([employer])  Secondary:  [facility]  [address]  [address]  [facility] [address]  Phone: [phone]  Fax: [phone] Plan Frequency: 1x/week for 90 days    Plan of Care/Certification Expiration Date: 25        Plan of Care/Certification Expiration Date:  Plan of Care/Certification Expiration Date: 25    Frequency/Duration: Plan Frequency: 1x/week for 90 days      Time In/Out:   Time In: 1602  Time Out: 1653      PT Visit Info:    Plan Frequency: 1x/week for 90 days  Total # of Visits Approved: 0 (EVAL ONLY)  Total # of Visits to Date: 1      Visit Count:  1                OUTPATIENT PHYSICAL THERAPY:             Initial Assessment 2025               Episode (PFPT)         Treatment Diagnosis:     Mixed incontinence  Full incontinence of feces  Other lack of coordination  Other symptoms and signs involving the genitourinary system  Other specified disorders of muscle  Contributing Diagnosis:  Pelvic floor dysfunction in female (M62.98)  Medical/Referring Diagnosis:    Mixed incontinence [N39.46]  Full incontinence of feces [R15.9]  PFD (pelvic floor dysfunction) [M62.89]    Referring Physician:  Evita Temple APRN - NP  MD Orders:  PT Eval and Treat   Return MD Appt:  3/24/25  Date of Onset:  Onset Date:  (chronic)  Allergies:  Patient has no allergy information on record.  Restrictions/Precautions:    Cardiac Precautions: hypertension      Medications Last Reviewed:  2025     SUBJECTIVE   History of Injury/Illness (Reason for Referral):  Ms. Gallo is a 40 yo female referred to pelvic floor physical therapy (PFPT) by Evita Temple,* 2/2 Mixed incontinence [N39.46]  Full incontinence of feces [R15.9]  PFD (pelvic floor dysfunction) [M62.89].    Pt has been experiencing urinary incontinence for 5+ years. UUI started about 1 year ago. Pt states that when she feels an urge to urinate she is unable to get to

## 2025-02-20 ENCOUNTER — HOSPITAL ENCOUNTER (OUTPATIENT)
Dept: PHYSICAL THERAPY | Age: 40
Setting detail: RECURRING SERIES
Discharge: HOME | End: 2025-02-23
Payer: COMMERCIAL

## 2025-02-20 PROCEDURE — 97530 THERAPEUTIC ACTIVITIES: CPT

## 2025-02-20 PROCEDURE — 97110 THERAPEUTIC EXERCISES: CPT

## 2025-02-20 ASSESSMENT — PAIN SCALES - GENERAL: PAINLEVEL_OUTOF10: 0

## 2025-02-20 NOTE — PROGRESS NOTES
Annie Sabino  : 1985  Primary: [employer] ([employer])  Secondary:  [facility]  [address]  [address]  [facility] [address]  Phone: [phone]  Fax: [phone] Plan Frequency: 1x/week for 90 days    Plan of Care/Certification Expiration Date: 25        Plan of Care/Certification Expiration Date:  Plan of Care/Certification Expiration Date: 25    Frequency/Duration:   Plan Frequency: 1x/week for 90 days      Time In/Out:   Time In: 1605  Time Out: 1700      PT Visit Info:    Total # of Visits Approved: 10 ((incl. eval) 25-3/21/25)  Total # of Visits to Date: 2      Visit Count:  2    OUTPATIENT PHYSICAL THERAPY:   Treatment Note 2025       Episode  (PFPT)               Treatment Diagnosis:   Mixed incontinence  Full incontinence of feces  Other lack of coordination  Other symptoms and signs involving the genitourinary system  Other specified disorders of muscle  Contributing Diagnosis:  Pelvic floor dysfunction in female (M62.98)  Medical/Referring Diagnosis:    Mixed incontinence [N39.46]  Full incontinence of feces [R15.9]  PFD (pelvic floor dysfunction) [M62.89]  Referring Physician:  Evita Temple APRN - NP  MD Orders:  PT Eval and Treat   Return MD Appt:  3/24/25   Date of Onset:  Onset Date:  (chronic)  Allergies:   Patient has no allergy information on record.  Restrictions/Precautions:   {Precautions/Restrictions:04712}      Interventions Planned (Treatment may consist of any combination of the following):     See Assessment Note    Subjective Comments:   ***  Initial Pain Level::     0/10  Post Session Pain Level:        /10  Medications Last Reviewed:  2025  Updated Objective Findings:  {New Findings:97413}  Treatment   THERAPEUTIC EXERCISE: (*** minutes): Exercises per grid below to improve {PT MOBILITY:14022250}.  Required {NO/MIN/MOD/MAX:31606} {CUES:85806097} cues to {PT ALIGNMENT:10438516}.  Progressed {desc:44600064} as indicated.

## 2025-02-27 ENCOUNTER — HOSPITAL ENCOUNTER (OUTPATIENT)
Dept: PHYSICAL THERAPY | Age: 40
Setting detail: RECURRING SERIES
End: 2025-02-27
Payer: COMMERCIAL

## 2025-03-04 ENCOUNTER — HOSPITAL ENCOUNTER (OUTPATIENT)
Dept: PHYSICAL THERAPY | Age: 40
Setting detail: RECURRING SERIES
Discharge: HOME | End: 2025-03-07
Payer: COMMERCIAL

## 2025-03-04 PROCEDURE — 97110 THERAPEUTIC EXERCISES: CPT

## 2025-03-04 PROCEDURE — 97530 THERAPEUTIC ACTIVITIES: CPT

## 2025-03-04 ASSESSMENT — PAIN SCALES - GENERAL: PAINLEVEL_OUTOF10: 0

## 2025-03-04 NOTE — PROGRESS NOTES
Annie Sabino  : 1985  Primary: [employer] ([employer])  Secondary:  [facility]  [address]  [address]  [facility] [address]  Phone: [phone]  Fax: [phone] Plan Frequency: 1x/week for 90 days    Plan of Care/Certification Expiration Date: 25        Plan of Care/Certification Expiration Date:  Plan of Care/Certification Expiration Date: 25    Frequency/Duration:   Plan Frequency: 1x/week for 90 days      Time In/Out:   Time In: 1630  Time Out: 1729      PT Visit Info:    Total # of Visits Approved: 10 ((incl. eval) 25-3/21/25)  Total # of Visits to Date: 3  Canceled Appointment: 1      Visit Count:  3    OUTPATIENT PHYSICAL THERAPY:   Treatment Note 3/4/2025       Episode  (PFPT)               Treatment Diagnosis:   Mixed incontinence  Full incontinence of feces  Other lack of coordination  Other symptoms and signs involving the genitourinary system  Other specified disorders of muscle  Contributing Diagnosis:  Pelvic floor dysfunction in female (M62.98)  Medical/Referring Diagnosis:    Mixed incontinence [N39.46]  Full incontinence of feces [R15.9]  PFD (pelvic floor dysfunction) [M62.89]  Referring Physician:  Evita Temple APRN - NP  MD Orders:  PT Eval and Treat   Return MD Appt:  3/24/25   Date of Onset:  Onset Date:  (chronic)  Allergies:   Patient has no allergy information on record.  Restrictions/Precautions:   Cardiac Precautions: hypertension      Interventions Planned (Treatment may consist of any combination of the following):     See Assessment Note    Subjective Comments:   Pt has been doing well with exercises. Really working on breathing with kegel contractions. She is using urge suppression with good success when she remember to use it. Reports pad usage to ~4PPD. She continues to work on her diet and exercise routine for weight lose.  Initial Pain Level::     0/10  Post Session Pain Level:       0/10  Medications Last Reviewed: